# Patient Record
Sex: MALE | Race: WHITE | Employment: FULL TIME | URBAN - METROPOLITAN AREA
[De-identification: names, ages, dates, MRNs, and addresses within clinical notes are randomized per-mention and may not be internally consistent; named-entity substitution may affect disease eponyms.]

---

## 2019-10-18 ENCOUNTER — HOSPITAL ENCOUNTER (OUTPATIENT)
Dept: ORTHOPEDIC SURGERY | Age: 44
Discharge: HOME | End: 2019-10-20
Payer: COMMERCIAL

## 2019-10-18 DIAGNOSIS — M25.569 KNEE PAIN, UNSPECIFIED CHRONICITY, UNSPECIFIED LATERALITY: ICD-10-CM

## 2019-10-18 PROCEDURE — 73564 X-RAY EXAM KNEE 4 OR MORE: CPT

## 2019-11-13 ENCOUNTER — HOSPITAL ENCOUNTER (OUTPATIENT)
Dept: MRI IMAGING | Age: 44
Discharge: HOME | End: 2019-11-15
Payer: COMMERCIAL

## 2019-11-13 DIAGNOSIS — M23.92 ACUTE INTERNAL DERANGEMENT OF LEFT KNEE: ICD-10-CM

## 2019-11-13 PROCEDURE — 73721 MRI JNT OF LWR EXTRE W/O DYE: CPT

## 2023-04-17 DIAGNOSIS — F41.9 ANXIETY: Primary | ICD-10-CM

## 2023-04-17 RX ORDER — ESCITALOPRAM OXALATE 10 MG/1
10 TABLET ORAL DAILY
Qty: 90 TABLET | Refills: 0 | Status: SHIPPED | OUTPATIENT
Start: 2023-04-17 | End: 2023-05-25 | Stop reason: DRUGHIGH

## 2023-04-17 RX ORDER — ESCITALOPRAM OXALATE 10 MG/1
10 TABLET ORAL DAILY
COMMUNITY
End: 2023-04-17 | Stop reason: SDUPTHER

## 2023-05-22 PROBLEM — G47.33 OSA (OBSTRUCTIVE SLEEP APNEA): Status: ACTIVE | Noted: 2023-05-22

## 2023-05-22 PROBLEM — M54.2 NECK PAIN: Status: ACTIVE | Noted: 2023-05-22

## 2023-05-22 PROBLEM — R07.89 ATYPICAL CHEST PAIN: Status: ACTIVE | Noted: 2023-05-22

## 2023-05-22 PROBLEM — M75.82 ROTATOR CUFF TENDINITIS, LEFT: Status: ACTIVE | Noted: 2023-05-22

## 2023-05-22 PROBLEM — R42 DIZZINESS: Status: ACTIVE | Noted: 2023-05-22

## 2023-05-22 PROBLEM — R29.818 SUSPECTED SLEEP APNEA: Status: ACTIVE | Noted: 2023-05-22

## 2023-05-22 PROBLEM — M25.562 LEFT KNEE PAIN: Status: ACTIVE | Noted: 2023-05-22

## 2023-05-22 PROBLEM — R79.89 LOW TESTOSTERONE IN MALE: Status: ACTIVE | Noted: 2023-05-22

## 2023-05-22 PROBLEM — F41.9 ANXIETY: Status: ACTIVE | Noted: 2023-05-22

## 2023-05-22 PROBLEM — G47.00 INSOMNIA: Status: ACTIVE | Noted: 2023-05-22

## 2023-05-22 PROBLEM — M54.50 CHRONIC LOW BACK PAIN: Status: ACTIVE | Noted: 2023-05-22

## 2023-05-22 PROBLEM — E66.9 OBESITY (BMI 30-39.9): Status: ACTIVE | Noted: 2023-05-22

## 2023-05-22 PROBLEM — I25.10 CAD (CORONARY ARTERY DISEASE): Status: ACTIVE | Noted: 2023-05-22

## 2023-05-22 PROBLEM — M25.512 LEFT SHOULDER PAIN: Status: ACTIVE | Noted: 2023-05-22

## 2023-05-22 PROBLEM — R53.82 CHRONIC FATIGUE: Status: ACTIVE | Noted: 2023-05-22

## 2023-05-22 PROBLEM — K64.9 HEMORRHOIDS: Status: ACTIVE | Noted: 2023-05-22

## 2023-05-22 PROBLEM — G89.29 CHRONIC LOW BACK PAIN: Status: ACTIVE | Noted: 2023-05-22

## 2023-05-22 PROBLEM — E66.01 OBESITY, MORBID (MULTI): Status: ACTIVE | Noted: 2023-05-22

## 2023-05-22 PROBLEM — E78.5 HYPERLIPIDEMIA: Status: ACTIVE | Noted: 2023-05-22

## 2023-05-22 PROBLEM — Z52.6 LIVER DONOR: Status: ACTIVE | Noted: 2023-05-22

## 2023-05-22 PROBLEM — K92.1 HEMATOCHEZIA: Status: ACTIVE | Noted: 2023-05-22

## 2023-05-22 PROBLEM — K62.5 BRBPR (BRIGHT RED BLOOD PER RECTUM): Status: ACTIVE | Noted: 2023-05-22

## 2023-05-22 PROBLEM — K21.9 GERD (GASTROESOPHAGEAL REFLUX DISEASE): Status: ACTIVE | Noted: 2023-05-22

## 2023-05-22 PROBLEM — N52.9 ERECTILE DYSFUNCTION: Status: ACTIVE | Noted: 2023-05-22

## 2023-05-22 PROBLEM — E55.9 VITAMIN D DEFICIENCY: Status: ACTIVE | Noted: 2023-05-22

## 2023-05-22 PROBLEM — R79.89 ELEVATED LFTS: Status: ACTIVE | Noted: 2023-05-22

## 2023-05-22 PROBLEM — R06.83 SNORING: Status: ACTIVE | Noted: 2023-05-22

## 2023-05-22 PROBLEM — K76.0 FATTY LIVER: Status: ACTIVE | Noted: 2023-05-22

## 2023-05-22 PROBLEM — Z95.5 H/O HEART ARTERY STENT: Status: ACTIVE | Noted: 2023-05-22

## 2023-05-22 RX ORDER — CLOPIDOGREL BISULFATE 75 MG/1
75 TABLET ORAL DAILY
COMMUNITY
End: 2024-01-15 | Stop reason: ALTCHOICE

## 2023-05-22 RX ORDER — HYDROGEN PEROXIDE 3 %
20 SOLUTION, NON-ORAL MISCELLANEOUS
COMMUNITY
Start: 2020-12-07 | End: 2023-10-23 | Stop reason: SDUPTHER

## 2023-05-22 RX ORDER — ROSUVASTATIN CALCIUM 20 MG/1
20 TABLET, COATED ORAL DAILY
COMMUNITY
End: 2023-12-05 | Stop reason: SDUPTHER

## 2023-05-22 RX ORDER — ACETAMINOPHEN 500 MG
50 TABLET ORAL DAILY
COMMUNITY

## 2023-05-22 RX ORDER — NAPROXEN SODIUM 220 MG/1
1 TABLET, FILM COATED ORAL DAILY
COMMUNITY
Start: 2020-12-22 | End: 2024-03-05 | Stop reason: SDUPTHER

## 2023-05-22 RX ORDER — PRAMOXINE HYDROCHLORIDE HYDROCORTISONE ACETATE 100; 100 MG/10G; MG/10G
1 AEROSOL, FOAM TOPICAL 2 TIMES DAILY
COMMUNITY
Start: 2020-08-12 | End: 2023-05-25 | Stop reason: SDUPTHER

## 2023-05-22 RX ORDER — HYDROXYZINE PAMOATE 25 MG/1
25 CAPSULE ORAL EVERY 8 HOURS PRN
COMMUNITY
End: 2024-01-15 | Stop reason: ALTCHOICE

## 2023-05-22 RX ORDER — ATENOLOL 25 MG/1
25 TABLET ORAL DAILY
COMMUNITY
End: 2024-01-15 | Stop reason: SDUPTHER

## 2023-05-22 RX ORDER — AMOXICILLIN 500 MG
CAPSULE ORAL 2 TIMES DAILY
COMMUNITY
End: 2024-01-15 | Stop reason: DRUGHIGH

## 2023-05-22 RX ORDER — TADALAFIL 20 MG/1
20 TABLET ORAL DAILY PRN
COMMUNITY
Start: 2020-06-15 | End: 2023-05-25

## 2023-05-25 ENCOUNTER — OFFICE VISIT (OUTPATIENT)
Dept: PRIMARY CARE | Facility: CLINIC | Age: 48
End: 2023-05-25
Payer: COMMERCIAL

## 2023-05-25 VITALS
DIASTOLIC BLOOD PRESSURE: 78 MMHG | WEIGHT: 278.8 LBS | TEMPERATURE: 99 F | HEART RATE: 76 BPM | OXYGEN SATURATION: 95 % | HEIGHT: 70 IN | SYSTOLIC BLOOD PRESSURE: 120 MMHG | BODY MASS INDEX: 39.91 KG/M2

## 2023-05-25 DIAGNOSIS — E78.2 MIXED HYPERLIPIDEMIA: Primary | ICD-10-CM

## 2023-05-25 DIAGNOSIS — E66.01 CLASS 3 SEVERE OBESITY WITH SERIOUS COMORBIDITY AND BODY MASS INDEX (BMI) OF 40.0 TO 44.9 IN ADULT, UNSPECIFIED OBESITY TYPE (MULTI): ICD-10-CM

## 2023-05-25 DIAGNOSIS — I25.10 CORONARY ARTERY DISEASE INVOLVING NATIVE CORONARY ARTERY OF NATIVE HEART WITHOUT ANGINA PECTORIS: ICD-10-CM

## 2023-05-25 DIAGNOSIS — F41.9 ANXIETY: ICD-10-CM

## 2023-05-25 DIAGNOSIS — K64.9 HEMORRHOIDS, UNSPECIFIED HEMORRHOID TYPE: ICD-10-CM

## 2023-05-25 DIAGNOSIS — G47.33 OSA (OBSTRUCTIVE SLEEP APNEA): ICD-10-CM

## 2023-05-25 DIAGNOSIS — N52.9 ERECTILE DYSFUNCTION, UNSPECIFIED ERECTILE DYSFUNCTION TYPE: ICD-10-CM

## 2023-05-25 DIAGNOSIS — E55.9 VITAMIN D DEFICIENCY: ICD-10-CM

## 2023-05-25 PROBLEM — M25.512 LEFT SHOULDER PAIN: Status: RESOLVED | Noted: 2023-05-22 | Resolved: 2023-05-25

## 2023-05-25 PROBLEM — R06.83 SNORING: Status: RESOLVED | Noted: 2023-05-22 | Resolved: 2023-05-25

## 2023-05-25 PROBLEM — M54.2 NECK PAIN: Status: RESOLVED | Noted: 2023-05-22 | Resolved: 2023-05-25

## 2023-05-25 PROBLEM — E66.9 OBESITY (BMI 30-39.9): Status: RESOLVED | Noted: 2023-05-22 | Resolved: 2023-05-25

## 2023-05-25 PROBLEM — R29.818 SUSPECTED SLEEP APNEA: Status: RESOLVED | Noted: 2023-05-22 | Resolved: 2023-05-25

## 2023-05-25 PROBLEM — M25.562 LEFT KNEE PAIN: Status: RESOLVED | Noted: 2023-05-22 | Resolved: 2023-05-25

## 2023-05-25 PROCEDURE — 3008F BODY MASS INDEX DOCD: CPT | Performed by: FAMILY MEDICINE

## 2023-05-25 PROCEDURE — 1036F TOBACCO NON-USER: CPT | Performed by: FAMILY MEDICINE

## 2023-05-25 PROCEDURE — 99214 OFFICE O/P EST MOD 30 MIN: CPT | Performed by: FAMILY MEDICINE

## 2023-05-25 RX ORDER — TADALAFIL 20 MG/1
20 TABLET ORAL DAILY PRN
Qty: 10 TABLET | Refills: 0 | Status: CANCELLED | OUTPATIENT
Start: 2023-05-25

## 2023-05-25 RX ORDER — SILDENAFIL 100 MG/1
100 TABLET, FILM COATED ORAL DAILY PRN
Qty: 30 TABLET | Refills: 0 | Status: SHIPPED | OUTPATIENT
Start: 2023-05-25 | End: 2024-06-06

## 2023-05-25 RX ORDER — PRAMOXINE HYDROCHLORIDE HYDROCORTISONE ACETATE 100; 100 MG/10G; MG/10G
1 AEROSOL, FOAM TOPICAL 2 TIMES DAILY
Qty: 10 G | Refills: 2 | Status: SHIPPED | OUTPATIENT
Start: 2023-05-25

## 2023-05-25 RX ORDER — ESCITALOPRAM OXALATE 20 MG/1
20 TABLET ORAL DAILY
Qty: 90 TABLET | Refills: 0 | Status: SHIPPED | OUTPATIENT
Start: 2023-05-25 | End: 2023-05-25 | Stop reason: SDUPTHER

## 2023-05-25 RX ORDER — ERGOCALCIFEROL 1.25 MG/1
CAPSULE ORAL
COMMUNITY
Start: 2020-05-10 | End: 2024-01-15 | Stop reason: ALTCHOICE

## 2023-05-25 RX ORDER — ESCITALOPRAM OXALATE 20 MG/1
20 TABLET ORAL DAILY
Qty: 90 TABLET | Refills: 0 | Status: SHIPPED | OUTPATIENT
Start: 2023-05-25 | End: 2023-08-29

## 2023-05-25 ASSESSMENT — PATIENT HEALTH QUESTIONNAIRE - PHQ9
SUM OF ALL RESPONSES TO PHQ9 QUESTIONS 1 AND 2: 0
1. LITTLE INTEREST OR PLEASURE IN DOING THINGS: NOT AT ALL
2. FEELING DOWN, DEPRESSED OR HOPELESS: NOT AT ALL

## 2023-05-25 NOTE — PROGRESS NOTES
Subjective   Patient ID: Orlando Washington is a 48 y.o. male who presents for Follow-up.    HPI     5/25/2023: He states he is experiencing hemorrhoidal flare ups  when taking Cialis.  He is looking to switch to Viagra.    Patient has hyperlipidemia.  He does try to limit the amount of fatty foods and high cholesterol foods that are consumed.  Pt has been compliant with rosuvastatin and denies any noted side effects.     He has CAD.  Pt underwent PCI with stenting in the past.  He follows with cardiology on a regular basis.  He denies any chest pain, or exercise intolerance.     Pt has vitamin D deficiency.  He is compliant with his vitamin d supplement.     He has anxiety.  Patient reports that his anxiety has been stable on the current dose of the escitalopram.     Pt has GERD.   Patient states that PPIs working well to control symptoms and denies any breakthrough heartburn.      Pt has ALHAJI.  He has been trying to utilize it nightly.  He finds the CPAP mask on the ground when waking up and believes he is taking it off subconsciously.     Pt has elevated LFTs on labs.  Ultrasound on 5/27/22 revealed fatty liver disease.  He will continue to follow with Dr. Boyle (hepatology). Next visit in in July 2023.       Review of Systems    Constitutional: Patient denies any fever, chills, loss of appetite, or unexplained weight loss.  Cardiovascular: Patient denies any chest pain, shortness of breath with exertion, tachycardia, palpitations, orthopnea, or paroxysmal nocturnal dyspnea.  Respiratory: Patient denies any cough, shortness breath, or wheezing.  Gastrointestinal patient denies any nausea, vomiting, diarrhea, constipation, melena, hematochezia, or reflux symptoms  Skin: Denies any rashes or skin lesions   Neurology: Patient denies any new motor or sensory losses. Denies any numbness, tingling, and weakness. Patient also denies any tremor, seizures, or gait instability.  Endocrinology: Denies any polyuria, polydipsia,  "polyphagia, or heat/cold intolerance.     SEE ALSO HISTORY OF PRESENT ILLNESS     Objective   /78   Pulse 76   Temp 37.2 °C (99 °F)   Ht 1.778 m (5' 10\")   Wt 126 kg (278 lb 12.8 oz)   SpO2 95%   BMI 40.00 kg/m²     Physical Exam    Gen. appearance: Alert and cooperative, in no acute distress, well-developed, well-nourished obese male.     Neck: Supple and without adenopathy, no JVD at 90° and no carotid bruits are noted. There is no thyromegaly, thyroid tenderness, or palpable thyroid nodules.   Cardiovascular: Regular rate and rhythm without murmur or ectopy.  Respiratory: Lungs are clear to auscultation bilaterally with good air exchange. Good respiratory effort, no accessory muscle use.  Skin: Good turgor, moist, warm and without rashes or lesions.  Neurological exam: Alert and oriented x3, no tremor, normal gait.  Psychiatric: Appropriate mood and affect, good insight and judgment, no delusions or thought disorders, no suicidal or homicidal ideation.  Extremities: No clubbing, cyanosis, or edema.     Assessment/Plan     Elevated LFTs / Fatty liver: Ultrasound of the liver was done 5/27/2022 and labs done 6/11/2022.  Pt saw Dr. Boyle 12/2022. Ordered a Fibroscan and additional labs.  Pt has follow up in 7/2023.        Hyperlipidemia: Stable based on last labs.  Patient will continue with the current medication. Dietary changes, exercise, and maintenance of healthy weight were discussed at length.     Coronary artery disease: Stable based on symptoms.  Patient is without worrisome signs or symptoms for unstable angina.   Risk factor modification was discussed at length.   Dietary changes, exercise and maintenance of a healthy weight were discussed.     Vitamin D deficiency: Stable on last labs.   Patient will continue taking vitamin D 2000 units QD OTC.     Erectile dysfunction: 5/25/2023: Discontinued tadalafil and prescribed sildenafil 100 mg PRN due to reported side effects of hemorroidal " exacerbation when he uses the Cialis.    Sleep Apnea:   Pt is not utilizing CPAP effectively as he is taking it off subconsciously during his sleep.     Anxiety:  Stable based on symptoms.  We will continue the escitalopram at the current dose.    Hemorrhoids:  He can use Proctofoam as needed for symptoms.  We can refer to GI for further evaluation if symptoms do not improve.    Obesity: Dietary changes, exercise, and maintenance of a healthy weight were discussed at length.         COLONOSCOPY DUE 1/7/2031  PSA DUE 12/28/2023    By signing my name below, I, Margaux Goodson Scribe, attest that this documentation has been prepared under the direction and in the presence of Dr. Maxwell.  All medical record entries made by the Scribe were at my direction and personally dictated by me. I have reviewed the chart and agree that the record accurately reflects my personal performance of the history, physical exam, discussion and plan. (Dr. Maxwell).

## 2023-05-25 NOTE — PATIENT INSTRUCTIONS
Changed the Cialis to Viagra as requested.    Continue the current medications. Follow up in 3 months with labs PRIOR.    It was a pleasure to see you today. Thank you for choosing us for your health care needs.    If you have lab or other testing completed and have not been informed of results within one week, please call the office for your results.    If you haven't done so, consider signing up for [employer], the [facility] personal health record. Ask the staff how you can get started.

## 2023-06-16 PROBLEM — E66.813 CLASS 3 SEVERE OBESITY WITH SERIOUS COMORBIDITY AND BODY MASS INDEX (BMI) OF 40.0 TO 44.9 IN ADULT: Status: ACTIVE | Noted: 2023-05-22

## 2023-07-17 LAB
ALANINE AMINOTRANSFERASE (SGPT) (U/L) IN SER/PLAS: 46 U/L (ref 10–52)
ALBUMIN (G/DL) IN SER/PLAS: 4.2 G/DL (ref 3.4–5)
ALKALINE PHOSPHATASE (U/L) IN SER/PLAS: 66 U/L (ref 33–120)
ASPARTATE AMINOTRANSFERASE (SGOT) (U/L) IN SER/PLAS: 31 U/L (ref 9–39)
BILIRUBIN DIRECT (MG/DL) IN SER/PLAS: 0.1 MG/DL (ref 0–0.3)
BILIRUBIN TOTAL (MG/DL) IN SER/PLAS: 0.4 MG/DL (ref 0–1.2)
PROTEIN TOTAL: 7.1 G/DL (ref 6.4–8.2)

## 2023-08-29 ENCOUNTER — OFFICE VISIT (OUTPATIENT)
Dept: PRIMARY CARE | Facility: CLINIC | Age: 48
End: 2023-08-29
Payer: COMMERCIAL

## 2023-08-29 VITALS
SYSTOLIC BLOOD PRESSURE: 122 MMHG | TEMPERATURE: 97.6 F | OXYGEN SATURATION: 96 % | DIASTOLIC BLOOD PRESSURE: 80 MMHG | BODY MASS INDEX: 39.93 KG/M2 | HEIGHT: 70 IN | HEART RATE: 98 BPM | WEIGHT: 278.9 LBS

## 2023-08-29 DIAGNOSIS — G47.33 OSA (OBSTRUCTIVE SLEEP APNEA): ICD-10-CM

## 2023-08-29 DIAGNOSIS — N52.9 ERECTILE DYSFUNCTION, UNSPECIFIED ERECTILE DYSFUNCTION TYPE: ICD-10-CM

## 2023-08-29 DIAGNOSIS — I25.10 CORONARY ARTERY DISEASE INVOLVING NATIVE CORONARY ARTERY OF NATIVE HEART WITHOUT ANGINA PECTORIS: ICD-10-CM

## 2023-08-29 DIAGNOSIS — R41.3 MEMORY CHANGES: ICD-10-CM

## 2023-08-29 DIAGNOSIS — E78.2 MIXED HYPERLIPIDEMIA: Primary | ICD-10-CM

## 2023-08-29 DIAGNOSIS — E66.01 CLASS 3 SEVERE OBESITY WITH SERIOUS COMORBIDITY AND BODY MASS INDEX (BMI) OF 40.0 TO 44.9 IN ADULT, UNSPECIFIED OBESITY TYPE (MULTI): ICD-10-CM

## 2023-08-29 DIAGNOSIS — F41.9 ANXIETY: ICD-10-CM

## 2023-08-29 DIAGNOSIS — E55.9 VITAMIN D DEFICIENCY: ICD-10-CM

## 2023-08-29 PROCEDURE — 1036F TOBACCO NON-USER: CPT | Performed by: FAMILY MEDICINE

## 2023-08-29 PROCEDURE — 3008F BODY MASS INDEX DOCD: CPT | Performed by: FAMILY MEDICINE

## 2023-08-29 PROCEDURE — 99214 OFFICE O/P EST MOD 30 MIN: CPT | Performed by: FAMILY MEDICINE

## 2023-08-29 RX ORDER — VENLAFAXINE HYDROCHLORIDE 75 MG/1
75 CAPSULE, EXTENDED RELEASE ORAL DAILY
Qty: 90 CAPSULE | Refills: 0 | Status: SHIPPED | OUTPATIENT
Start: 2023-08-29 | End: 2023-09-27 | Stop reason: SINTOL

## 2023-08-29 RX ORDER — VENLAFAXINE HYDROCHLORIDE 37.5 MG/1
37.5 CAPSULE, EXTENDED RELEASE ORAL DAILY
Qty: 7 CAPSULE | Refills: 0 | Status: SHIPPED | OUTPATIENT
Start: 2023-08-29 | End: 2023-09-27 | Stop reason: SDUPTHER

## 2023-08-29 ASSESSMENT — PATIENT HEALTH QUESTIONNAIRE - PHQ9
10. IF YOU CHECKED OFF ANY PROBLEMS, HOW DIFFICULT HAVE THESE PROBLEMS MADE IT FOR YOU TO DO YOUR WORK, TAKE CARE OF THINGS AT HOME, OR GET ALONG WITH OTHER PEOPLE: NOT DIFFICULT AT ALL
SUM OF ALL RESPONSES TO PHQ9 QUESTIONS 1 AND 2: 2
2. FEELING DOWN, DEPRESSED OR HOPELESS: SEVERAL DAYS
1. LITTLE INTEREST OR PLEASURE IN DOING THINGS: SEVERAL DAYS

## 2023-08-29 NOTE — PROGRESS NOTES
"Subjective   Patient ID: Orlando Washington is a 48 y.o. male who presents for Follow-up.    HPI     8/29/2023  LABS ORDERED FOR TODAY WERE NOT COMPLETED.    Not sure Lexapro is helping.  States that he \"feels like it is not doing anything.\"  He notes worsening of emotional control.  He also notes flat mood and affect.    He states he is having memory difficulties due to having too many thoughts in his head.  He tried taking a supplement and states it helped him for the first week.  He notes a decline in his appetite and exercise due to focus issues.      Patient has hyperlipidemia.  He does try to limit the amount of fatty foods and high cholesterol foods that are consumed.  Pt has been compliant with rosuvastatin and denies any noted side effects.     He has CAD.  Pt underwent PCI with stenting in the past.  He follows with cardiology on a regular basis.  He denies any chest pain, or exercise intolerance.     Pt has vitamin D deficiency.  He is compliant with his vitamin d supplement.      He has anxiety.  Patient reports that his anxiety has been stable on the current dose of the escitalopram.     Pt has GERD.   Patient states that PPIs working well to control symptoms and denies any breakthrough heartburn.      Pt has ALHAJI.  He has been trying to utilize it nightly.  He finds the CPAP mask on the ground when waking up and believes he is taking it off subconsciously.  8/29/2023: He is still unable to use his CPAP due to taking it off, regardless of what mask he puts on.     Pt has elevated LFTs on labs.  Ultrasound on 5/27/22 revealed fatty liver disease.  He will continue to follow with Dr. Boyle (hepatology). Next visit in in July 2023.       Review of Systems    Constitutional: Patient denies any fever, chills, loss of appetite, or unexplained weight loss.  HEENT: Denies any headache, sore throat, eye pain, ear pain, decreased vision, or decreased hearing. Patient also denies any rhinorrhea.  Cardiovascular: Patient " "denies any chest pain, shortness of breath with exertion, tachycardia, palpitations, orthopnea, or paroxysmal nocturnal dyspnea.  Respiratory: Patient denies any cough, shortness of breath, or wheezing.  Gastrointestinal: Patient denies any nausea, vomiting, diarrhea, constipation, melena, hematochezia, or reflux symptoms.  Musculoskeletal: Patient denies any myalgia, arthralgia, joint swelling, or joint deformity.  Skin: Denies any rashes or skin lesions.  Neurology: Patient denies any new motor or sensory losses. Denies any numbness, tingling, weakness, and incoordination of the extremities. Patient also denies any tremor, seizures, or gait instability.  Endocrinology: Denies any polyuria, polydipsia, polyphagia, or heat/cold intolerance.  Psychiatric: Denies any anxiety, depression, or suicidal/homicidal ideation.  Hematology: Patient denies any abnormal bruising or bleeding.  Genitourinary: Patient denies any dysuria, hematuria, urinary urgency/frequency.    SEE HISTORY OF PRESENT ILLNESS ALSO    Objective   /86   Pulse 98   Temp 36.4 °C (97.6 °F)   Ht 1.778 m (5' 10\")   Wt 127 kg (278 lb 14.4 oz)   SpO2 96%   BMI 40.02 kg/m²     Physical Exam    Gen. appearance: Alert and cooperative, in no acute distress, well-developed, well-nourished obese male.     Neck: Supple and without adenopathy, no JVD at 90° and no carotid bruits are noted. There is no thyromegaly, thyroid tenderness, or palpable thyroid nodules.   Cardiovascular: Regular rate and rhythm without murmur or ectopy.  Respiratory: Lungs are clear to auscultation bilaterally with good air exchange. Good respiratory effort, no accessory muscle use.  Skin: Good turgor, moist, warm and without rashes or lesions.  Neurological exam: Alert and oriented x3, no tremor, normal gait.  Psychiatric: Appropriate mood and affect, good insight and judgment, no delusions or thought disorders, no suicidal or homicidal ideation.  Extremities: No clubbing, " cyanosis, or edema.     Assessment/Plan   LABS ORDERED FOR 8/29/2023 VISIT WERE NOT COMPLETED.      Hyperlipidemia: Stable based on last labs.  Patient will continue with the current medication. Dietary changes, exercise, and maintenance of healthy weight were discussed at length.     Coronary artery disease: Stable based on symptoms.  Patient is without worrisome signs or symptoms for unstable angina.   Risk factor modification was discussed at length.   Dietary changes, exercise and maintenance of a healthy weight were discussed.     Vitamin D deficiency: Stable on last labs.   Patient will continue taking vitamin D 2000 units QD OTC.     Erectile dysfunction:   Stable based on symptoms  Continue sildenafil as needed.     Sleep Apnea:   Pt is not utilizing CPAP effectively as he is taking it off subconsciously during his sleep.     Anxiety:  8/29/2023: Venlafaxine prescribed as a replacement for escitalopram which does not seem to be relieving symptoms.    Memory changes: (NEW) 8/29/2023: Referral to neurology made.      Obesity: Dietary changes, exercise, and maintenance of a healthy weight were discussed at length.          COLONOSCOPY DUE 1/7/2031  PSA DUE 12/28/2023    Orders Placed This Encounter   Procedures    Comprehensive Metabolic Panel    Lipid Panel    Referral to Neurology     Requested Prescriptions     Signed Prescriptions Disp Refills    venlafaxine XR (Effexor-XR) 75 mg 24 hr capsule 90 capsule 0     Sig: Take 1 capsule (75 mg) by mouth once daily. Do not crush or chew.    venlafaxine XR (Effexor-XR) 37.5 mg 24 hr capsule 7 capsule 0     Sig: Take 1 capsule (37.5 mg) by mouth once daily. Do not crush or chew.             By signing my name below, I, Lubna Miner, attest that this documentation has been prepared under the direction and in the presence of Dr. Maxwell.  All medical record entries made by the Lubna were at my direction and personally dictated by me. I have reviewed the  chart and agree that the record accurately reflects my personal performance of the history, physical exam, discussion and plan. (Dr. Maxwell).

## 2023-09-04 DIAGNOSIS — F41.9 ANXIETY: ICD-10-CM

## 2023-09-06 RX ORDER — VENLAFAXINE HYDROCHLORIDE 37.5 MG/1
37.5 CAPSULE, EXTENDED RELEASE ORAL DAILY
Qty: 7 CAPSULE | Refills: 0 | OUTPATIENT
Start: 2023-09-06

## 2023-09-26 ENCOUNTER — TELEPHONE (OUTPATIENT)
Dept: PRIMARY CARE | Facility: CLINIC | Age: 48
End: 2023-09-26
Payer: COMMERCIAL

## 2023-09-26 DIAGNOSIS — F41.9 ANXIETY: Primary | ICD-10-CM

## 2023-09-26 NOTE — TELEPHONE ENCOUNTER
Patient called asking to be off Effexor due to brain fog? Would like to see if he can get something else?

## 2023-09-27 RX ORDER — PAROXETINE 10 MG/1
10 TABLET, FILM COATED ORAL EVERY MORNING
Qty: 30 TABLET | Refills: 2 | Status: SHIPPED | OUTPATIENT
Start: 2023-09-27 | End: 2023-10-12 | Stop reason: SINTOL

## 2023-09-27 RX ORDER — VENLAFAXINE HYDROCHLORIDE 37.5 MG/1
37.5 CAPSULE, EXTENDED RELEASE ORAL DAILY
Qty: 7 CAPSULE | Refills: 0 | Status: SHIPPED | OUTPATIENT
Start: 2023-09-27 | End: 2023-10-12

## 2023-09-27 NOTE — TELEPHONE ENCOUNTER
Advise pt that we will need to reduce the dose to 37.5 mg once a day for 7 days, then stop the venlafaxine.    Then we can start the new medication called paroxetine 10 mg once a day.    Rxs were sent to his [facility] pharmacy in [place].

## 2023-10-04 DIAGNOSIS — F41.9 ANXIETY: ICD-10-CM

## 2023-10-04 RX ORDER — VENLAFAXINE HYDROCHLORIDE 37.5 MG/1
37.5 CAPSULE, EXTENDED RELEASE ORAL DAILY
Qty: 7 CAPSULE | Refills: 0 | OUTPATIENT
Start: 2023-10-04

## 2023-10-12 ENCOUNTER — TELEPHONE (OUTPATIENT)
Dept: PRIMARY CARE | Facility: CLINIC | Age: 48
End: 2023-10-12
Payer: COMMERCIAL

## 2023-10-12 DIAGNOSIS — F41.9 ANXIETY: Primary | ICD-10-CM

## 2023-10-12 RX ORDER — BUSPIRONE HYDROCHLORIDE 5 MG/1
TABLET ORAL
Qty: 120 TABLET | Refills: 0 | Status: SHIPPED | OUTPATIENT
Start: 2023-10-12 | End: 2023-10-23 | Stop reason: SDUPTHER

## 2023-10-12 NOTE — TELEPHONE ENCOUNTER
Continue off the paroxetine and we will start him on Buspar instead.  Follow the directions on the new RX.

## 2023-10-12 NOTE — TELEPHONE ENCOUNTER
"Patient states he has been having trouble with medication he was rx. He states he has been experiencing extreme fatigue, a feeling of dread, and loudness which was described as a \"power surge\" which got overwhelming for him. He states he was switched from venlafaxine to the paroxetine but is now afraid to take the paroxetine due to the side effects. He is scheduled with Nery on 10/23, but wants to know what he could do or if he can have an earlier virtual appt, please advise   "

## 2023-10-23 ENCOUNTER — TELEMEDICINE (OUTPATIENT)
Dept: PRIMARY CARE | Facility: CLINIC | Age: 48
End: 2023-10-23
Payer: COMMERCIAL

## 2023-10-23 DIAGNOSIS — F41.9 ANXIETY: ICD-10-CM

## 2023-10-23 DIAGNOSIS — K21.9 GASTROESOPHAGEAL REFLUX DISEASE WITHOUT ESOPHAGITIS: Primary | ICD-10-CM

## 2023-10-23 DIAGNOSIS — F32.A DEPRESSION, UNSPECIFIED DEPRESSION TYPE: ICD-10-CM

## 2023-10-23 PROCEDURE — 99213 OFFICE O/P EST LOW 20 MIN: CPT | Performed by: PHYSICIAN ASSISTANT

## 2023-10-23 RX ORDER — BUSPIRONE HYDROCHLORIDE 15 MG/1
15 TABLET ORAL 2 TIMES DAILY
Qty: 60 TABLET | Refills: 2 | Status: SHIPPED | OUTPATIENT
Start: 2023-10-23

## 2023-10-23 RX ORDER — HYDROGEN PEROXIDE 3 %
20 SOLUTION, NON-ORAL MISCELLANEOUS
Qty: 90 CAPSULE | Refills: 0 | Status: SHIPPED | OUTPATIENT
Start: 2023-10-23 | End: 2024-06-06 | Stop reason: SDUPTHER

## 2023-10-23 RX ORDER — SERTRALINE HYDROCHLORIDE 25 MG/1
TABLET, FILM COATED ORAL
Qty: 60 TABLET | Refills: 2 | Status: SHIPPED | OUTPATIENT
Start: 2023-10-23 | End: 2023-10-30

## 2023-10-23 NOTE — PROGRESS NOTES
Subjective   Patient ID: Orlando Washington is a 48 y.o. male who presents for Medication Problem.    HPI     Anxiety/depression-patient is complaining of unstable mood.  He was previously treated with Lexapro and that was not working so on 8/29/2023 Dr. Maxwell switched him over to Effexor.  Patient states that the Effexor brought out aggression, anger, and lack of patience in him.  He also restarted smoking because of his anxiety/depression.  He has no thoughts of hurting himself or others but does not like who his mood was on the Effexor so he stopped that about a month ago.  He is currently calling because he would like to restart on something else to help with his mood.  He has failed Paxil in the past but stated he is willing to try something like Paxil or Zoloft again to help with depression/anxiety.   He still is using BuSpar 10 mg twice daily but states that its not working as well as it used to.    GERD-patient was previously prescribed Nexium and would like to restart that because he is now having heartburn that wakes him up in the middle of the night.  He denies any blood in his stool/black tar-like stools.  He tries to modify his diet but states that that is not working well for him at this moment.        Review of Systems  Constitutional: Patient denies any fever, chills, loss of appetite, or unexplained weight loss.  Cardiovascular: Patient denies any chest pain, shortness of breath with exertion, tachycardia, palpitations, orthopnea, or paroxysmal nocturnal dyspnea.  Respiratory: Patient denies any cough, shortness breath, or wheezing.  Gastrointestinal patient denies any nausea, vomiting, diarrhea, constipation, melena, hematochezia, or reflux symptoms  Skin: Denies any rashes or skin lesions   Neurology: Patient denies any new motor or sensory losses.  Denies any numbness, tingling, weakness, and incoordination of the extremities.  Patient also denies any tremor, seizures, or gait  instability.  Endocrinology: Denies any polyuria, polydipsia, polyphagia, or heat/cold intolerance.    Objective   There were no vitals taken for this visit. VV no vitals     Physical Exam  Gen. appearance: Alert and cooperative, no acute distress, well developed, well-nourished male.  Resp- no acute resp distress  Skin- no rashes of lesions on visible skin surfaces  Neuro- A & O x 3, no tremor    Assessment/Plan   Diagnoses and all orders for this visit:  Gastroesophageal reflux disease without esophagitis  -     esomeprazole (NexIUM) 20 mg DR capsule; Take 1 capsule (20 mg) by mouth once daily in the morning. Take before meals.  Pt will restart Nexium to use once daily to help with symptom relief.  If symptoms or not improved within the next 2 weeks he is to call/return to the clinic and we will discuss other options including a possible EGD.    Anxiety & Depression, unspecified depression type-  -     busPIRone (Buspar) 15 mg tablet; Take 1 tablet (15 mg) by mouth 2 times a day.  -     sertraline (Zoloft) 25 mg tablet; Take 1 tab daily x 7 days then on day 8 increase up to 2 tablets daily as maintenance dose,  -     Referral to Psychiatry; Future  Patient has already failed Lexapro, Effexor (ended up bringing bipolar type characteristics).  He has been referred to psychiatry for further evaluation and treatment but in the meantime we will increase his BuSpar to 15 mg twice daily and add Zoloft 25 mg x 7 days and then increase to 2 tablets daily as a maintenance dose until he sees a psychiatrist.  He has no thoughts of hurting himself or others.    We will call him with psychiatry appointment once that has been scheduled.    Patient understands that should they have testing outside   facilities that we may not receive the results and was told to call us if they have not heard from our office within a week after testing.    [facility] uses voice recognition technology for dictations.  Sometimes the software misinterprets words. Please take this into account when reading this.

## 2023-10-29 DIAGNOSIS — F41.9 ANXIETY: ICD-10-CM

## 2023-10-30 RX ORDER — SERTRALINE HYDROCHLORIDE 25 MG/1
TABLET, FILM COATED ORAL
Qty: 30 TABLET | Refills: 1 | Status: SHIPPED | OUTPATIENT
Start: 2023-10-30 | End: 2023-12-05 | Stop reason: DRUGHIGH

## 2023-12-02 ENCOUNTER — LAB (OUTPATIENT)
Dept: LAB | Facility: LAB | Age: 48
End: 2023-12-02
Payer: COMMERCIAL

## 2023-12-02 DIAGNOSIS — I25.10 CORONARY ARTERY DISEASE INVOLVING NATIVE CORONARY ARTERY OF NATIVE HEART WITHOUT ANGINA PECTORIS: ICD-10-CM

## 2023-12-02 DIAGNOSIS — E78.2 MIXED HYPERLIPIDEMIA: ICD-10-CM

## 2023-12-02 LAB
ALBUMIN SERPL BCP-MCNC: 4.5 G/DL (ref 3.4–5)
ALP SERPL-CCNC: 67 U/L (ref 33–120)
ALT SERPL W P-5'-P-CCNC: 68 U/L (ref 10–52)
ANION GAP SERPL CALC-SCNC: 11 MMOL/L (ref 10–20)
AST SERPL W P-5'-P-CCNC: 59 U/L (ref 9–39)
BILIRUB SERPL-MCNC: 0.8 MG/DL (ref 0–1.2)
BUN SERPL-MCNC: 22 MG/DL (ref 6–23)
CALCIUM SERPL-MCNC: 9.5 MG/DL (ref 8.6–10.3)
CHLORIDE SERPL-SCNC: 105 MMOL/L (ref 98–107)
CHOLEST SERPL-MCNC: 122 MG/DL (ref 0–199)
CHOLESTEROL/HDL RATIO: 4.2
CO2 SERPL-SCNC: 27 MMOL/L (ref 21–32)
CREAT SERPL-MCNC: 1.12 MG/DL (ref 0.5–1.3)
GFR SERPL CREATININE-BSD FRML MDRD: 81 ML/MIN/1.73M*2
GLUCOSE SERPL-MCNC: 106 MG/DL (ref 74–99)
HDLC SERPL-MCNC: 28.8 MG/DL
LDLC SERPL CALC-MCNC: 38 MG/DL
NON HDL CHOLESTEROL: 93 MG/DL (ref 0–149)
POTASSIUM SERPL-SCNC: 3.9 MMOL/L (ref 3.5–5.3)
PROT SERPL-MCNC: 7.5 G/DL (ref 6.4–8.2)
SODIUM SERPL-SCNC: 139 MMOL/L (ref 136–145)
TRIGL SERPL-MCNC: 275 MG/DL (ref 0–149)
VLDL: 55 MG/DL (ref 0–40)

## 2023-12-02 PROCEDURE — 80061 LIPID PANEL: CPT

## 2023-12-02 PROCEDURE — 84443 ASSAY THYROID STIM HORMONE: CPT

## 2023-12-02 PROCEDURE — 80053 COMPREHEN METABOLIC PANEL: CPT

## 2023-12-02 PROCEDURE — 36415 COLL VENOUS BLD VENIPUNCTURE: CPT

## 2023-12-05 ENCOUNTER — OFFICE VISIT (OUTPATIENT)
Dept: PRIMARY CARE | Facility: CLINIC | Age: 48
End: 2023-12-05
Payer: COMMERCIAL

## 2023-12-05 VITALS
DIASTOLIC BLOOD PRESSURE: 78 MMHG | SYSTOLIC BLOOD PRESSURE: 120 MMHG | WEIGHT: 277.4 LBS | BODY MASS INDEX: 39.71 KG/M2 | TEMPERATURE: 98.1 F | HEIGHT: 70 IN | OXYGEN SATURATION: 94 % | HEART RATE: 88 BPM

## 2023-12-05 DIAGNOSIS — R41.3 MEMORY CHANGES: ICD-10-CM

## 2023-12-05 DIAGNOSIS — E66.01 CLASS 2 SEVERE OBESITY WITH SERIOUS COMORBIDITY AND BODY MASS INDEX (BMI) OF 39.0 TO 39.9 IN ADULT, UNSPECIFIED OBESITY TYPE (MULTI): ICD-10-CM

## 2023-12-05 DIAGNOSIS — F41.9 ANXIETY: ICD-10-CM

## 2023-12-05 DIAGNOSIS — I25.10 CORONARY ARTERY DISEASE INVOLVING NATIVE CORONARY ARTERY OF NATIVE HEART WITHOUT ANGINA PECTORIS: ICD-10-CM

## 2023-12-05 DIAGNOSIS — N52.9 ERECTILE DYSFUNCTION, UNSPECIFIED ERECTILE DYSFUNCTION TYPE: ICD-10-CM

## 2023-12-05 DIAGNOSIS — E55.9 VITAMIN D DEFICIENCY: ICD-10-CM

## 2023-12-05 DIAGNOSIS — G47.33 OSA (OBSTRUCTIVE SLEEP APNEA): ICD-10-CM

## 2023-12-05 DIAGNOSIS — E78.2 MIXED HYPERLIPIDEMIA: Primary | ICD-10-CM

## 2023-12-05 PROBLEM — E66.812 CLASS 2 SEVERE OBESITY WITH SERIOUS COMORBIDITY AND BODY MASS INDEX (BMI) OF 39.0 TO 39.9 IN ADULT: Status: ACTIVE | Noted: 2023-05-22

## 2023-12-05 LAB — TSH SERPL-ACNC: 1.93 MIU/L (ref 0.44–3.98)

## 2023-12-05 PROCEDURE — 1036F TOBACCO NON-USER: CPT | Performed by: FAMILY MEDICINE

## 2023-12-05 PROCEDURE — 3008F BODY MASS INDEX DOCD: CPT | Performed by: FAMILY MEDICINE

## 2023-12-05 PROCEDURE — 99214 OFFICE O/P EST MOD 30 MIN: CPT | Performed by: FAMILY MEDICINE

## 2023-12-05 RX ORDER — SERTRALINE HYDROCHLORIDE 25 MG/1
100 TABLET, FILM COATED ORAL DAILY
Status: SHIPPED
Start: 2023-12-05 | End: 2023-12-31 | Stop reason: DRUGHIGH

## 2023-12-05 RX ORDER — ROSUVASTATIN CALCIUM 20 MG/1
20 TABLET, COATED ORAL DAILY
Qty: 90 TABLET | Refills: 0 | Status: SHIPPED | OUTPATIENT
Start: 2023-12-05 | End: 2024-03-05 | Stop reason: SDUPTHER

## 2023-12-05 NOTE — PATIENT INSTRUCTIONS
Flu vax declined.    Follow up in 3 months.    It was a pleasure to see you today. Thank you for choosing us for your health care needs.    If you have lab or other testing completed and have not been informed of results within one week, please call the office for your results.    If you haven't done so, consider signing up for [facility] [employer], the [facility] personal health record. Ask the staff how you can get started.

## 2023-12-05 NOTE — PROGRESS NOTES
Subjective   Patient ID: Orlando Washington is a 48 y.o. male who presents for Follow-up.    HPI     No new concerns  Labs: 12/02/2023  Colonoscopy: 2021    Pt has anxiety:  Pt saw my PA 10/23/2023 and started Zoloft.  He has failed paroxetine and venlafaxine due to side effects.  Pt was referred to psychiatry at that time as well.  He did see psychiatry and zoloft was increased to 100 mg and remains on the Buspirone.  Anxiety symptoms are better at the higher dose.    Had complaints of memory changes at his 8/29/2023 appt.  Was referred to neurology but has not yet scheduled.        Patient has hyperlipidemia.  He does try to limit the amount of fatty foods and high cholesterol foods that are consumed.  Pt has been compliant with rosuvastatin and denies any noted side effects.     He has CAD.  Pt underwent PCI with stenting in the past.  He follows with cardiology on a regular basis.  He denies any chest pain, or exercise intolerance.     Pt has vitamin D deficiency.  He is compliant with his vitamin d supplement.      He has anxiety.  Patient reports that his anxiety has been stable on the current dose of the Zoloft.     Pt has GERD.   Patient states that PPIs working well to control symptoms and denies any breakthrough heartburn.      Pt has ALHAJI.  He has been trying to utilize it nightly.  He is still unable to use his CPAP all night due to taking it off (during the night) regardless of what mask he puts on.     Has known fatty liver Dz with elevated LFTs.  Was evaluated by hepatology.         Review of Systems  Constitutional: Patient denies any fever, chills, loss of appetite, or unexplained weight loss.  Cardiovascular: Patient denies any chest pain, shortness of breath with exertion, tachycardia, palpitations, orthopnea, or paroxysmal nocturnal dyspnea.  Respiratory: Patient denies any cough, shortness breath, or wheezing.  Gastrointestinal: Patient denies any nausea, vomiting, diarrhea, constipation, melena,  "hematochezia, or reflux symptoms.  Skin: Denies any rashes or skin lesions.   Neurology: Patient denies any new motor or sensory losses.  Denies any numbness, tingling, weakness, and incoordination of the extremities.  Patient also denies any tremor, seizures, or gait instability.  Endocrinology: Denies any polyuria, polydipsia, polyphagia, or heat/cold intolerance.      Objective   /78   Pulse 88   Temp 36.7 °C (98.1 °F)   Ht 1.778 m (5' 10\")   Wt 126 kg (277 lb 6.4 oz)   SpO2 94%   BMI 39.80 kg/m²     Physical Exam  General Appearance: Alert and cooperative, in no acute distress, well-developed/well-nourished overweight male.    Neck: Supple and without adenopathy or rigidity.  There is no JVD at 90° and no carotid bruits are noted.  There is no thyromegaly, thyroid tenderness, or palpable thyroid nodules.  Heart: Regular rate and rhythm without murmur or ectopy.  Respiratory: Lungs are clear to auscultation bilaterally with good air exchange.  Good respiratory effort and no accessory muscle use.  Skin: Good turgor, moist, warm and without rashes or lesions.  Neurological exam: Alert and oriented ×3, no tremor, normal gait.  Extremities: No clubbing, cyanosis, or edema    Assessment/Plan     Hyperlipidemia: Stable based on last labs.  Patient will continue with the current medication. Dietary changes, exercise, and maintenance of healthy weight were discussed at length.     Coronary artery disease: Stable based on symptoms.  Patient is without worrisome signs or symptoms for unstable angina.   Risk factor modification was discussed at length.   Dietary changes, exercise and maintenance of a healthy weight were discussed.     Vitamin D deficiency: Stable on last labs.   Patient will continue taking vitamin D 2000 units QD OTC.     Erectile dysfunction:   Stable based on symptoms  Continue sildenafil as needed.     Sleep Apnea:   Pt is not utilizing CPAP effectively as he frequently takes it off " subconsciously during his sleep.     Anxiety:  Stable based on symptoms.  Continue medication at current dose.  CAN CHANGE TO  MG TABLET FOR EASE OF DOSING IN FUTURE.    Memory changes: NEW AT HIS 8/29/2023 APPT.  HE WAS REFERRED TO NEUROLOGY AT PREVIOUS APPT.   He has not yet been evaluated.      Obesity: Dietary changes, exercise, and maintenance of a healthy weight were discussed at length.            COLONOSCOPY DUE 1/7/2031  PSA DUE 12/28/2023      Orders Placed This Encounter   Procedures    TSH with reflex to Free T4 if abnormal     Requested Prescriptions     Signed Prescriptions Disp Refills    rosuvastatin (Crestor) 20 mg tablet 90 tablet 0     Sig: Take 1 tablet (20 mg) by mouth once daily.    sertraline (Zoloft) 25 mg tablet       Sig: Take 4 tablets (100 mg) by mouth once daily.

## 2023-12-31 RX ORDER — SERTRALINE HYDROCHLORIDE 25 MG/1
100 TABLET, FILM COATED ORAL DAILY
Status: SHIPPED
Start: 2023-12-31

## 2024-01-15 ENCOUNTER — OFFICE VISIT (OUTPATIENT)
Dept: CARDIOLOGY | Facility: CLINIC | Age: 49
End: 2024-01-15
Payer: COMMERCIAL

## 2024-01-15 VITALS
WEIGHT: 273.6 LBS | DIASTOLIC BLOOD PRESSURE: 80 MMHG | HEIGHT: 70 IN | HEART RATE: 72 BPM | BODY MASS INDEX: 39.17 KG/M2 | OXYGEN SATURATION: 97 % | SYSTOLIC BLOOD PRESSURE: 118 MMHG

## 2024-01-15 DIAGNOSIS — Z95.5 H/O HEART ARTERY STENT: ICD-10-CM

## 2024-01-15 DIAGNOSIS — I25.10 CORONARY ARTERY DISEASE INVOLVING NATIVE CORONARY ARTERY OF NATIVE HEART, UNSPECIFIED WHETHER ANGINA PRESENT: Primary | ICD-10-CM

## 2024-01-15 DIAGNOSIS — I25.10 CORONARY ARTERY DISEASE INVOLVING NATIVE CORONARY ARTERY OF NATIVE HEART, UNSPECIFIED WHETHER ANGINA PRESENT: ICD-10-CM

## 2024-01-15 DIAGNOSIS — E78.1 HYPERTRIGLYCERIDEMIA: ICD-10-CM

## 2024-01-15 DIAGNOSIS — G47.33 OBSTRUCTIVE SLEEP APNEA: ICD-10-CM

## 2024-01-15 DIAGNOSIS — E78.49 OTHER HYPERLIPIDEMIA: ICD-10-CM

## 2024-01-15 DIAGNOSIS — R00.2 PALPITATIONS: ICD-10-CM

## 2024-01-15 PROCEDURE — 1036F TOBACCO NON-USER: CPT | Performed by: INTERNAL MEDICINE

## 2024-01-15 PROCEDURE — 3008F BODY MASS INDEX DOCD: CPT | Performed by: INTERNAL MEDICINE

## 2024-01-15 PROCEDURE — 99215 OFFICE O/P EST HI 40 MIN: CPT | Performed by: INTERNAL MEDICINE

## 2024-01-15 RX ORDER — ICOSAPENT ETHYL 1 G/1
1 CAPSULE ORAL
Qty: 180 CAPSULE | Refills: 3 | Status: SHIPPED | OUTPATIENT
Start: 2024-01-15 | End: 2025-01-14

## 2024-01-15 RX ORDER — ATENOLOL 25 MG/1
25 TABLET ORAL DAILY
Qty: 90 TABLET | Refills: 3 | Status: SHIPPED | OUTPATIENT
Start: 2024-01-15 | End: 2024-01-17

## 2024-01-15 ASSESSMENT — PAIN SCALES - GENERAL: PAINLEVEL: 0-NO PAIN

## 2024-01-15 NOTE — PROGRESS NOTES
"Chief Complaint:   see below     History Of Present Illness:    Orlando Washington is a 48 y.o. male presenting with CAD, palpitations.    This 48-year-old hypertensive, hyperlipidemic, hypertriglyceridemic man with obesity and coronary artery disease returns to the office in routine follow-up. The patient is status post prior drug-eluting stent of the LAD in December 2019 along with PTCA of the diagonal branch. He has a 50% stenosis in the mid RCA and has normal LV function.     He has had are episodes of palpitations that last for about 30 seconds.  These episodes feel like his heart is racing, associated with which he feels winded.  He has had episodic heartburn after taking Viagra or Cialis.  The symptoms of heartburn last for 24 hours, and are not exertional.  He walks around the block, and does resistance exercises every other day for an hour.      He has ALHAJI, and is not using the prescribed apparatus.       Last Recorded Vitals:  Vitals:    01/15/24 1328   BP: 118/80   BP Location: Right arm   Patient Position: Sitting   BP Cuff Size: Adult   Pulse: 72   SpO2: 97%   Weight: 124 kg (273 lb 9.6 oz)   Height: 1.778 m (5' 10\")   Body mass index is 39.26 kg/m².      Past Medical History:  He has a past medical history of Personal history of other diseases of the circulatory system (01/14/2020) and Personal history of other specified conditions (01/14/2020).    Past Surgical History:  He has a past surgical history that includes Other surgical history (01/14/2021); Other surgical history (12/10/2020); Other surgical history (12/10/2020); Other surgical history (12/10/2020); and Other surgical history (12/10/2020).      Social History:  He reports that he has never smoked. He has never used smokeless tobacco. He reports current alcohol use. He reports that he does not use drugs.    Family History:  No family history on file.     Allergies:  Effexor [venlafaxine]    Outpatient Medications:  Current Outpatient Medications "   Medication Instructions    aspirin 81 mg chewable tablet 1 tablet, oral, Daily    atenolol (TENORMIN) 25 mg, oral, Daily    busPIRone (BUSPAR) 15 mg, oral, 2 times daily    cholecalciferol (VITAMIN D-3) 50 mcg, oral, Daily    esomeprazole (NEXIUM) 20 mg, oral, Daily before breakfast    hydrocortisone-pramoxine (Proctofoam HC) rectal foam 1 applicator, rectal, 2 times daily    icosapent ethyL (VASCEPA) 1 g, oral, 2 times daily with meals    psyllium seed, with sugar, (FIBER SUPPLEMENT ORAL) once a day    rosuvastatin (CRESTOR) 20 mg, oral, Daily    sertraline (ZOLOFT) 100 mg, oral, Daily    sildenafil (VIAGRA) 100 mg, oral, Daily PRN       Physical Exam:  GENERAL:  pleasant 48 year-old  HEENT: No xanthelasma  NECK: Supple, no palpable adenopathy or thyromegaly  CHEST: Clear to auscultation, respiratory effort unlabored  CARDIAC: RRR, normal S1 and S2, no audible murmur, rub, gallop, carotids are brisk, PMI is not displaced  ABD: Active bowel sounds, nontender, no organomegaly, no evidence of ascites  EXT: No clubbing, cyanosis, edema, or tenderness  NEURO: Awake, alert, appropriate, speech is fluent       Last Labs:  CBC -  Lab Results   Component Value Date    WBC 6.4 12/14/2021    HGB 16.2 12/14/2021    HCT 48.4 12/14/2021    MCV 92 12/14/2021     12/14/2021       CMP -  Lab Results   Component Value Date    CALCIUM 9.5 12/02/2023    PROT 7.5 12/02/2023    ALBUMIN 4.5 12/02/2023    AST 59 (H) 12/02/2023    ALT 68 (H) 12/02/2023    ALKPHOS 67 12/02/2023    BILITOT 0.8 12/02/2023       LIPID PANEL -   Lab Results   Component Value Date    CHOL 122 12/02/2023    TRIG 275 (H) 12/02/2023    HDL 28.8 12/02/2023    CHHDL 4.2 12/02/2023    LDLF 39 02/18/2023    VLDL 55 (H) 12/02/2023    NHDL 93 12/02/2023       RENAL FUNCTION PANEL -   Lab Results   Component Value Date    GLUCOSE 106 (H) 12/02/2023     12/02/2023    K 3.9 12/02/2023     12/02/2023    CO2 27 12/02/2023    ANIONGAP 11 12/02/2023    BUN  22 12/02/2023    CREATININE 1.12 12/02/2023    GFRMALE >90 02/18/2023    CALCIUM 9.5 12/02/2023    ALBUMIN 4.5 12/02/2023        Lab Results   Component Value Date    BNP 21 12/27/2019    HGBA1C 6.0 (A) 12/27/2022         Lab review: I have Chemistry CMP:   Lab Results   Component Value Date    ALBUMIN 4.5 12/02/2023    CALCIUM 9.5 12/02/2023    CO2 27 12/02/2023    CREATININE 1.12 12/02/2023    GLUCOSE 106 (H) 12/02/2023    BILITOT 0.8 12/02/2023    PROT 7.5 12/02/2023    ALT 68 (H) 12/02/2023    AST 59 (H) 12/02/2023    ALKPHOS 67 12/02/2023   , Chemistry BMP   Lab Results   Component Value Date    GLUCOSE 106 (H) 12/02/2023    CALCIUM 9.5 12/02/2023    CO2 27 12/02/2023    CREATININE 1.12 12/02/2023   , CBC:  Lab Results   Component Value Date    WBC 6.4 12/14/2021    RBC 5.27 12/14/2021    HGB 16.2 12/14/2021    HCT 48.4 12/14/2021    MCV 92 12/14/2021    MCHC 33.5 12/14/2021    RDW 12.1 12/14/2021    NRBC 0.0 12/27/2019   , and Lipids:   Lab Results   Component Value Date    CHOL 122 12/02/2023    HDL 28.8 12/02/2023    LDLCALC 38 12/02/2023    TRIG 275 (H) 12/02/2023       Assessment/Plan   Problem List Items Addressed This Visit          Cardiac and Vasculature    CAD (coronary artery disease) - Primary    Relevant Medications    atenolol (Tenormin) 25 mg tablet    H/O heart artery stent    Hyperlipidemia    Palpitations    Relevant Orders    Holter Or Event Cardiac Monitor    Follow Up In Cardiology    Hypertriglyceridemia    Relevant Medications    icosapent ethyL (Vascepa) 1 gram capsule       Endocrine/Metabolic    BMI 39.0-39.9,adult       Sleep    Obstructive sleep apnea    Relevant Orders    Referral to Adult Sleep Medicine     1.  CAD: The patient has stable, functional class I CAD, and is doing well.  No additional testing is necessary at present. Important aspects of lifestyle modification were discussed in detail with the patient.  Recent labs and testing have been reviewed.  -In regards to lipid  management, see below    2.  Hypertriglyceridemia: Start Icosapent ethyl 1000 mg twice daily, risk factor modification discussed.    3.  Heartburn following sildenafil: This is a known side effect of this medication, and his description of symptoms as being persistent for 24 hours, and not exertional does not sound anginal.    4.  Untreated sleep apnea: Discussed with the patient the potential cardiac sequelae of this.  -Referral to sleep medicine    5.  Palpitations: In light of his history, the possibility of an arrhythmia such as atrial fibrillation is raised.  There has been no documentation of this.  -Monitor study  -Follow-up after testing is completed.    Armin Nava MD

## 2024-01-15 NOTE — PATIENT INSTRUCTIONS
You need to stop smoking. As you know, smoking increases the risk of future heart attacks, strokes, cancer, and emphysema. In addition to these problems, someone who smokes a pack a day spends $2000 per year on tobacco alone. Those costs add up, so that someone who has smoked a pack a day for twenty years has spent $40,000 out of pocket on tobacco in their lifetime. To help you quit smoking, you should call the [place] [facility]uit Line at 5-872-QUIT-NOW. They will have other tips to help you quit. Also, there are good medicines that can help you quit.  [facility] has a tobacco clinic that can guide you through the process.  Just let me know if you'd like a referral.

## 2024-01-16 ENCOUNTER — APPOINTMENT (OUTPATIENT)
Dept: NEUROLOGY | Facility: CLINIC | Age: 49
End: 2024-01-16
Payer: COMMERCIAL

## 2024-01-17 RX ORDER — ATENOLOL 25 MG/1
25 TABLET ORAL DAILY
Qty: 90 TABLET | Refills: 3 | Status: SHIPPED | OUTPATIENT
Start: 2024-01-17

## 2024-01-18 ENCOUNTER — HOSPITAL ENCOUNTER (OUTPATIENT)
Dept: CARDIOLOGY | Facility: HOSPITAL | Age: 49
Discharge: HOME | End: 2024-01-18
Payer: COMMERCIAL

## 2024-01-18 DIAGNOSIS — R00.2 PALPITATIONS: ICD-10-CM

## 2024-01-18 PROCEDURE — 93272 ECG/REVIEW INTERPRET ONLY: CPT | Performed by: INTERNAL MEDICINE

## 2024-01-18 PROCEDURE — 93270 REMOTE 30 DAY ECG REV/REPORT: CPT

## 2024-02-28 ENCOUNTER — APPOINTMENT (OUTPATIENT)
Dept: RADIOLOGY | Facility: HOSPITAL | Age: 49
End: 2024-02-28
Payer: COMMERCIAL

## 2024-02-28 ENCOUNTER — HOSPITAL ENCOUNTER (EMERGENCY)
Facility: HOSPITAL | Age: 49
Discharge: HOME | End: 2024-02-28
Payer: COMMERCIAL

## 2024-02-28 VITALS
HEART RATE: 87 BPM | WEIGHT: 256 LBS | RESPIRATION RATE: 18 BRPM | OXYGEN SATURATION: 98 % | TEMPERATURE: 97.3 F | DIASTOLIC BLOOD PRESSURE: 72 MMHG | HEIGHT: 70 IN | BODY MASS INDEX: 36.65 KG/M2 | SYSTOLIC BLOOD PRESSURE: 115 MMHG

## 2024-02-28 DIAGNOSIS — M79.643 TENDERNESS OF ANATOMICAL SNUFFBOX: ICD-10-CM

## 2024-02-28 DIAGNOSIS — S69.92XA INJURY OF LEFT WRIST, INITIAL ENCOUNTER: Primary | ICD-10-CM

## 2024-02-28 PROCEDURE — 29075 APPL CST ELBW FNGR SHORT ARM: CPT

## 2024-02-28 PROCEDURE — 73110 X-RAY EXAM OF WRIST: CPT | Mod: LT

## 2024-02-28 PROCEDURE — 2500000001 HC RX 250 WO HCPCS SELF ADMINISTERED DRUGS (ALT 637 FOR MEDICARE OP)

## 2024-02-28 PROCEDURE — 73130 X-RAY EXAM OF HAND: CPT | Mod: LT

## 2024-02-28 PROCEDURE — 99284 EMERGENCY DEPT VISIT MOD MDM: CPT | Mod: 25

## 2024-02-28 PROCEDURE — 73130 X-RAY EXAM OF HAND: CPT | Mod: LEFT SIDE | Performed by: SURGERY

## 2024-02-28 PROCEDURE — 73110 X-RAY EXAM OF WRIST: CPT | Mod: LEFT SIDE | Performed by: SURGERY

## 2024-02-28 RX ORDER — HYDROCODONE BITARTRATE AND ACETAMINOPHEN 5; 325 MG/1; MG/1
1 TABLET ORAL ONCE
Status: COMPLETED | OUTPATIENT
Start: 2024-02-28 | End: 2024-02-28

## 2024-02-28 RX ADMIN — HYDROCODONE BITARTRATE AND ACETAMINOPHEN 1 TABLET: 5; 325 TABLET ORAL at 22:30

## 2024-02-28 ASSESSMENT — PAIN DESCRIPTION - DESCRIPTORS: DESCRIPTORS: THROBBING

## 2024-02-28 ASSESSMENT — PAIN SCALES - GENERAL
PAINLEVEL_OUTOF10: 7
PAINLEVEL_OUTOF10: 3

## 2024-02-28 ASSESSMENT — COLUMBIA-SUICIDE SEVERITY RATING SCALE - C-SSRS
6. HAVE YOU EVER DONE ANYTHING, STARTED TO DO ANYTHING, OR PREPARED TO DO ANYTHING TO END YOUR LIFE?: NO
2. HAVE YOU ACTUALLY HAD ANY THOUGHTS OF KILLING YOURSELF?: NO
1. IN THE PAST MONTH, HAVE YOU WISHED YOU WERE DEAD OR WISHED YOU COULD GO TO SLEEP AND NOT WAKE UP?: NO

## 2024-02-28 ASSESSMENT — PAIN - FUNCTIONAL ASSESSMENT: PAIN_FUNCTIONAL_ASSESSMENT: 0-10

## 2024-02-28 ASSESSMENT — PAIN DESCRIPTION - ORIENTATION: ORIENTATION: LEFT

## 2024-02-28 ASSESSMENT — PAIN DESCRIPTION - LOCATION: LOCATION: WRIST

## 2024-02-28 ASSESSMENT — PAIN DESCRIPTION - PAIN TYPE: TYPE: ACUTE PAIN

## 2024-02-29 DIAGNOSIS — E78.2 MIXED HYPERLIPIDEMIA: ICD-10-CM

## 2024-02-29 NOTE — ED PROVIDER NOTES
HPI   Chief Complaint   Patient presents with    Fall     Pt fell in garage at home after tripping over boxes. Landed on ground, denies hitting head, no LOC. Takes ASA daily. C/O left wrist pain       History provided by: Patient and wife    Limitations to history: None    CC: Left wrist injury    HPI: 48-year-old male presents emergency department to be evaluated for a left wrist injury that occurred about an 30 minutes prior to arrival.  Patient was working in his garage when he accidentally tripped over some boxes falling and landing awkwardly on his wrist.  He denies head injury.  Denies losing consciousness.  Denies headache, vision changes, neck pain, nausea vomiting, lightheadedness or dizziness.  He takes a baby aspirin but denies use of anticoagulants or history intracranial hemorrhage.  Denies weakness, fatigue, syncope, or history of frequent falls.  Denies chest pain or difficulty breathing.  States that he was feeling well before hand.  Is currently complaining of pain in the palm of his hand as well as the distal wrist but reports some swelling.  Denies taking anything for his pain prior to arrival.  Denies pain or injury elsewhere.  Denies numbness or tingling in the extremity but states that it is hard for him to move his wrist and hand due to the pain.  Denies all other systemic symptoms.    ROS: Negative unless mentioned in HPI    Medical Hx:  medical history sent in for angina. Allergy to venlafaxine.  Immunizations up-to-date.    Physical exam:    Constitutional: Patient is well-nourished and well-developed.  Sitting comfortably in the room and in no distress.  Oriented to person, place, time, and situation.    HEENT: Head is normocephalic, atraumatic. Patient's airway is patent.  Tympanic membranes are clear bilaterally.  Nasal mucosa clear.  Mouth with normal mucosa.  Throat is not erythematous and there are no oropharyngeal exudates, uvula is midline.  No obvious facial deformities.    Eyes:  Clear bilaterally.  Pupils are equal round and reactive to light and accommodation.  Extraocular movements intact.      Cardiac: Regular rate, regular rhythm.  Heart sounds S1, S2.  No murmurs, rubs, or gallops.  PMI nondisplaced.  No JVD.    Respiratory: Regular respiratory rate and effort.  Breath sounds are clear and equal bilaterally, no adventitious lung sounds.  Patient is speaking in full sentences and is in no apparent respiratory distress. No use of accessory muscles.      Gastrointestinal: Abdomen is soft, nondistended, and nontender.  There are no obvious deformities.  No rebound tenderness or guarding.  Bowel sounds are normal active.    Genitourinary: No CVA or flank tenderness.    Musculoskeletal: Patient has tenderness over the distal radius and ulna as well as tenderness in the anatomical snuffbox.  There is some soft tissue swelling however he still has good range of motion in his wrist however it is limited given his pain and swelling.  He also has some nonspecific tenderness in the proximal and mid metacarpals but no tenderness or injury to the fingers.  No obvious bony deformities.  No tenderness or obvious injury elsewhere. No back or neck tenderness.  Capillary refill less than 3 seconds.  Strong peripheral pulses.  No sensory deficits.    Neurological: Patient is alert and oriented.  No focal deficits.  5/5 strength in all extremities.  Cranial nerves II through XII intact. GCS15.     Skin: Skin is normal color for race and is warm, dry, and intact.  No evidence of trauma.  No lesions, rashes, bruising, jaundice, or masses.    Psych: Appropriate mood and affect.  No apparent risk to self or others.    Heme/lymph: No adenopathy, lymphadenopathy, or splenomegaly    Physical exam is otherwise negative unless stated above or in history of present illness.    Patient updated on plan for lab testing, IV insertion, radiology imaging, and medications to be administered while in the ER (if indicated).  Patient updated on expected wait times for testing and results. Patient provided my name and told to ask any staff member for questions or concerns if they should arise. Electronic medical record reviewed.     MDM    Upon initial assessment, patient was healthy non-toxic appearing and in no apparent distress.     Patient presented to the emergency department with the chief complaint left wrist and hand injury. Patient has tenderness over the distal radius and ulna as well as tenderness in the anatomical snuffbox.  There is some soft tissue swelling however he still has good range of motion in his wrist however it is limited given his pain and swelling.  He also has some nonspecific tenderness in the proximal and mid metacarpals but no tenderness or injury to the fingers.  No obvious bony deformities.  No tenderness or obvious injury elsewhere. No back or neck tenderness.  Capillary refill less than 3 seconds.  Strong peripheral pulses.  No sensory deficits. On arrival to the emergency department, vital signs were within normal limits    Will give the patient a dose of p.o. Norco for his discomfort since his wife is able to drive him home and will get an x-ray of the wrist and hand.  X-ray confirmed the swelling but no obvious fracture.  I did place the patient in a thumb spica splint given his tenderness in the anatomical snuffbox in order to promote proper healing and prevent avascular necrosis.  This was done by me and the patient was neurovascularly intact before and after splint placement.  Patient will be [facility]  I discussed supportive gym including rest, ice, compression, and elevation as well as Tylenol as needed for his discomfort.  All questions and concerns addressed.  Reasons to return to ER discussed.  Patient verbalized understanding and agreement with the treatment plan and they remained hemodynamically stable in the ER.        This note was dictated  using a speech recognition program.  While an attempt was made at proof-reading to minimize errors, minor errors in transcription may be present                          [place] Coma Scale Score: 15                     Patient History   Past Medical History:   Diagnosis Date    Personal history of other diseases of the circulatory system 01/14/2020    History of angina pectoris    Personal history of other specified conditions 01/14/2020    History of palpitations     Past Surgical History:   Procedure Laterality Date    OTHER SURGICAL HISTORY  01/14/2021    Esophagogastroduodenoscopy    OTHER SURGICAL HISTORY  12/10/2020    Sinus surgery    OTHER SURGICAL HISTORY  12/10/2020    Vasectomy    OTHER SURGICAL HISTORY  12/10/2020    Colonoscopy    OTHER SURGICAL HISTORY  12/10/2020    Coronary artery stent placement     No family history on file.  Social History     Tobacco Use    Smoking status: Never    Smokeless tobacco: Never   Substance Use Topics    Alcohol use: Yes    Drug use: Never       Physical Exam   ED Triage Vitals [02/28/24 2218]   Temperature Heart Rate Respirations BP   36.3 °C (97.3 °F) 87 18 115/72      Pulse Ox Temp Source Heart Rate Source Patient Position   98 % Tympanic Monitor Sitting      BP Location FiO2 (%)     Right arm --       Physical Exam    ED Course & MDM   Diagnoses as of 02/28/24 2320   Injury of left wrist, initial encounter   Tenderness of anatomical snuffbox       Medical Decision Making      Procedure  Splint Application    Performed by: Donny Dominique PA-C  Authorized by: Donny Dominique PA-C    Consent:     Consent obtained:  Verbal    Consent given by:  Patient  Universal protocol:     Procedure explained and questions answered to patient or proxy's satisfaction: yes      Patient identity confirmed:  Verbally with patient  Pre-procedure details:     Distal neurologic exam:  Normal    Distal perfusion: distal pulses strong and brisk capillary refill    Procedure details:      Location:  Wrist    Wrist location:  L wrist    Cast type:  Short arm    Splint type:  Thumb spica    Supplies:  Elastic bandage, fiberglass and cotton padding    Attestation: Splint applied and adjusted personally by me    Post-procedure details:     Distal neurologic exam:  Normal    Distal perfusion: distal pulses strong and brisk capillary refill      Procedure completion:  Tolerated       Donny Dominique PA-C  02/28/24 [id number]       Donny Dominique PA-C  02/28/24 [id number]

## 2024-03-04 RX ORDER — ROSUVASTATIN CALCIUM 20 MG/1
20 TABLET, COATED ORAL DAILY
Qty: 90 TABLET | Refills: 0 | OUTPATIENT
Start: 2024-03-04

## 2024-03-05 ENCOUNTER — OFFICE VISIT (OUTPATIENT)
Dept: PRIMARY CARE | Facility: CLINIC | Age: 49
End: 2024-03-05
Payer: COMMERCIAL

## 2024-03-05 VITALS
SYSTOLIC BLOOD PRESSURE: 121 MMHG | TEMPERATURE: 98.5 F | OXYGEN SATURATION: 98 % | BODY MASS INDEX: 38.57 KG/M2 | DIASTOLIC BLOOD PRESSURE: 75 MMHG | WEIGHT: 269.4 LBS | HEIGHT: 70 IN | HEART RATE: 62 BPM

## 2024-03-05 DIAGNOSIS — E66.01 CLASS 2 SEVERE OBESITY WITH SERIOUS COMORBIDITY AND BODY MASS INDEX (BMI) OF 38.0 TO 38.9 IN ADULT, UNSPECIFIED OBESITY TYPE (MULTI): ICD-10-CM

## 2024-03-05 DIAGNOSIS — E78.2 MIXED HYPERLIPIDEMIA: Primary | ICD-10-CM

## 2024-03-05 DIAGNOSIS — Z12.5 PROSTATE CANCER SCREENING: ICD-10-CM

## 2024-03-05 DIAGNOSIS — F41.9 ANXIETY: ICD-10-CM

## 2024-03-05 DIAGNOSIS — N52.9 ERECTILE DYSFUNCTION, UNSPECIFIED ERECTILE DYSFUNCTION TYPE: ICD-10-CM

## 2024-03-05 DIAGNOSIS — E55.9 VITAMIN D DEFICIENCY: ICD-10-CM

## 2024-03-05 DIAGNOSIS — G47.33 OSA (OBSTRUCTIVE SLEEP APNEA): ICD-10-CM

## 2024-03-05 DIAGNOSIS — S63.502A SPRAIN OF LEFT FOREARM, INITIAL ENCOUNTER: ICD-10-CM

## 2024-03-05 DIAGNOSIS — I25.10 CORONARY ARTERY DISEASE INVOLVING NATIVE CORONARY ARTERY OF NATIVE HEART WITHOUT ANGINA PECTORIS: ICD-10-CM

## 2024-03-05 PROCEDURE — 3008F BODY MASS INDEX DOCD: CPT | Performed by: FAMILY MEDICINE

## 2024-03-05 PROCEDURE — 99214 OFFICE O/P EST MOD 30 MIN: CPT | Performed by: FAMILY MEDICINE

## 2024-03-05 RX ORDER — NAPROXEN SODIUM 220 MG/1
81 TABLET, FILM COATED ORAL DAILY
Qty: 90 TABLET | Refills: 0 | Status: SHIPPED | OUTPATIENT
Start: 2024-03-05 | End: 2024-06-06 | Stop reason: SDUPTHER

## 2024-03-05 RX ORDER — ROSUVASTATIN CALCIUM 20 MG/1
20 TABLET, COATED ORAL DAILY
Qty: 90 TABLET | Refills: 0 | Status: SHIPPED | OUTPATIENT
Start: 2024-03-05 | End: 2024-06-06 | Stop reason: SDUPTHER

## 2024-03-05 ASSESSMENT — PATIENT HEALTH QUESTIONNAIRE - PHQ9
SUM OF ALL RESPONSES TO PHQ9 QUESTIONS 1 AND 2: 0
2. FEELING DOWN, DEPRESSED OR HOPELESS: NOT AT ALL
1. LITTLE INTEREST OR PLEASURE IN DOING THINGS: NOT AT ALL

## 2024-03-05 NOTE — PROGRESS NOTES
Subjective   Patient ID: Orlando Washington is a 48 y.o. male who presents for Follow-up.    HPI     Patient states that he fell last Wednesday over some boxes that were laying around in the garage and hurt his arm.  He did go to [facility] for evaluation and was told there was no obvious fracture.   He was splinted and told to follow up with orthopedics and PCP.  X-ray report was reviewed and no fx was noted.  Splint removed from [facility] for examination.           Pt has anxiety:  10/23/2023 started on Zoloft.  He did see psychiatry and zoloft was increased to 100 mg and remains on the Buspirone.  Anxiety symptoms are better on the current dosing.         Patient has hyperlipidemia.  He does try to limit the amount of fatty foods and high cholesterol foods that are consumed.  Pt has been compliant with rosuvastatin and denies any noted side effects.     He has CAD.  Pt underwent PCI with stenting in the past.  He follows with cardiology on a regular basis.  He denies any chest pain, or exercise intolerance.     Pt has vitamin D deficiency.  He is compliant with his vitamin d supplement.      He has anxiety.  Patient reports that his anxiety has been stable on the current dose of the Zoloft.     Pt has GERD.   Patient states that PPIs working well to control symptoms and denies any breakthrough heartburn.      Pt has ALHAJI.  He has been trying to utilize it nightly.  He is still unable to use his CPAP all night due to taking it off (during the night) regardless of what mask he puts on.     Has known fatty liver Dz with elevated LFTs.  Was evaluated by hepatology.    Review of Systems  Constitutional: Patient denies any fever, chills, loss of appetite, or unexplained weight loss.  Cardiovascular: Patient denies any chest pain, shortness of breath with exertion, tachycardia, palpitations, orthopnea, or paroxysmal nocturnal dyspnea.  Respiratory: Patient denies any cough, shortness breath, or wheezing.  Gastrointestinal: Patient denies  "any nausea, vomiting, diarrhea, constipation, melena, hematochezia, or reflux symptoms.  Skin: Denies any rashes or skin lesions.   Neurology: Patient denies any new motor or sensory losses.  Denies any numbness, tingling, weakness, and incoordination of the extremities.  Patient also denies any tremor, seizures, or gait instability.  Endocrinology: Denies any polyuria, polydipsia, polyphagia, or heat/cold intolerance.      Objective   /75   Pulse 62   Temp 36.9 °C (98.5 °F)   Ht 1.778 m (5' 10\")   Wt 122 kg (269 lb 6.4 oz)   SpO2 98%   BMI 38.65 kg/m²     Physical Exam  General Appearance: Alert and cooperative, in no acute distress, well-developed/well-nourished, overweight male.    Neck: Supple and without adenopathy or rigidity.  There is no JVD at 90° and no carotid bruits are noted.  There is no thyromegaly, thyroid tenderness, or palpable thyroid nodules.  Heart: Regular rate and rhythm without murmur or ectopy.  Respiratory: Lungs are clear to auscultation bilaterally with good air exchange.  Good respiratory effort and no accessory muscle use.  Skin: Good turgor, moist, warm and without rashes or lesions.  Neurological exam: Alert and oriented ×3, no tremor, normal gait.  Extremities: No clubbing, cyanosis, or edema    MS: Examination of the left upper extremity after removal of the splint  Minimal tenderness to the forearm.  There is no tenderness over the left wrist and specifically no tenderness at the anatomic snuffbox. He has no noted swelling of the wrist of forearm.  Hand is neurovascularly intact.      Assessment/Plan   Hyperlipidemia: Stable based on last labs.  Patient will continue with the current medication. Dietary changes, exercise, and maintenance of healthy weight were discussed at length.     Coronary artery disease: Stable based on symptoms.  Patient is without worrisome signs or symptoms for unstable angina.   Risk factor modification was discussed at length.   Dietary " changes, exercise and maintenance of a healthy weight were discussed.     Vitamin D deficiency: Stable on last labs.   Patient will continue taking vitamin D 2000 units QD OTC.     Erectile dysfunction:   Stable based on symptoms  Continue sildenafil as needed.     Sleep Apnea:   Pt is not utilizing CPAP effectively as he frequently takes it off subconsciously during his sleep.     Anxiety:  Stable based on symptoms.  Continue medication at current dose.  CAN CHANGE TO  MG TABLET FOR EASE OF DOSING IN FUTURE.      Obesity: Dietary changes, exercise, and maintenance of a healthy weight were discussed at length.      Left forearm sprain:   X-ray neg for FX.  Exam was unremarkable.  He can remain out of the splint and return if any pain returns.       COLONOSCOPY DUE 1/7/2031  PSA DUE 12/28/2023        Orders Placed This Encounter   Procedures    Prostate Specific Antigen, Screen    Lipid Panel    Comprehensive Metabolic Panel    Vitamin D 25-Hydroxy,Total (for eval of Vitamin D levels)     Requested Prescriptions     Signed Prescriptions Disp Refills    aspirin 81 mg chewable tablet 90 tablet 0     Sig: Chew 1 tablet (81 mg) once daily.    rosuvastatin (Crestor) 20 mg tablet 90 tablet 0     Sig: Take 1 tablet (20 mg) by mouth once daily.

## 2024-03-05 NOTE — PATIENT INSTRUCTIONS
Your x-rays were all normal.  Splint was removed.  You can increase your activity and back off if you get pain.      Follow up in 3 months with labs to be done PRIOR.    It was a pleasure to see you today. Thank you for choosing us for your health care needs.    If you have lab or other testing completed and have not been informed of results within one week, please call the office for your results.    If you haven't done so, consider signing up for [facility] [employer], the [facility] personal health record. Ask the staff how you can get started.

## 2024-03-06 ENCOUNTER — OFFICE VISIT (OUTPATIENT)
Dept: CARDIOLOGY | Facility: CLINIC | Age: 49
End: 2024-03-06
Payer: COMMERCIAL

## 2024-03-06 VITALS
WEIGHT: 262 LBS | SYSTOLIC BLOOD PRESSURE: 118 MMHG | HEIGHT: 70 IN | DIASTOLIC BLOOD PRESSURE: 72 MMHG | HEART RATE: 64 BPM | BODY MASS INDEX: 37.51 KG/M2

## 2024-03-06 DIAGNOSIS — R00.2 PALPITATIONS: ICD-10-CM

## 2024-03-06 DIAGNOSIS — E78.49 OTHER HYPERLIPIDEMIA: ICD-10-CM

## 2024-03-06 DIAGNOSIS — E78.1 HYPERTRIGLYCERIDEMIA: ICD-10-CM

## 2024-03-06 DIAGNOSIS — I25.10 CORONARY ARTERY DISEASE INVOLVING NATIVE CORONARY ARTERY OF NATIVE HEART WITHOUT ANGINA PECTORIS: Primary | ICD-10-CM

## 2024-03-06 DIAGNOSIS — Z95.5 H/O HEART ARTERY STENT: ICD-10-CM

## 2024-03-06 PROCEDURE — 1036F TOBACCO NON-USER: CPT | Performed by: INTERNAL MEDICINE

## 2024-03-06 PROCEDURE — 99213 OFFICE O/P EST LOW 20 MIN: CPT | Performed by: INTERNAL MEDICINE

## 2024-03-06 PROCEDURE — 3008F BODY MASS INDEX DOCD: CPT | Performed by: INTERNAL MEDICINE

## 2024-03-06 NOTE — PATIENT INSTRUCTIONS

## 2024-03-06 NOTE — PROGRESS NOTES
"Chief Complaint:   Please see below.     History Of Present Illness:    Orlando Washington is a 48 y.o. male presenting with CAD.    This 48-year-old hypertensive, hyperlipidemic, hypertriglyceridemic man with obesity and coronary artery disease returns to the office in routine follow-up. The patient is status post prior drug-eluting stent of the LAD in December 2019 along with PTCA of the diagonal branch. He has a 50% stenosis in the mid RCA and has normal LV function.      He has lost 12 lbs since 1/15.  He feels much better.  The patient denies chest discomfort, dyspnea, palpitations, orthopnea, PND, syncope, and near syncope.       Last Recorded Vitals:  Vitals:    03/06/24 1500   BP: 118/72   BP Location: Left arm   Patient Position: Sitting   Pulse: 64   Weight: 119 kg (262 lb)   Height: 1.778 m (5' 10\")     Body mass index is 37.59 kg/m².    Past Medical History:  He has a past medical history of Personal history of other diseases of the circulatory system (01/14/2020) and Personal history of other specified conditions (01/14/2020).    Past Surgical History:  He has a past surgical history that includes Other surgical history (01/14/2021); Other surgical history (12/10/2020); Other surgical history (12/10/2020); Other surgical history (12/10/2020); and Other surgical history (12/10/2020).      Social History:  He reports that he has quit smoking. His smoking use included cigarettes. He has never used smokeless tobacco. He reports current alcohol use. He reports that he does not currently use drugs after having used the following drugs: Marijuana.    Family History:  Family History   Problem Relation Name Age of Onset    Supraventricular tachycardia Mother      Heart disease Father      Heart attack Brother      Other (Hole in Heart.) Mother's Brother      Heart murmur Son Andrei         Allergies:  Venlafaxine    Outpatient Medications:  Current Outpatient Medications   Medication Instructions    aspirin 81 mg, oral, " Daily    atenolol (TENORMIN) 25 mg, oral, Daily    busPIRone (BUSPAR) 15 mg, oral, 2 times daily    cholecalciferol (VITAMIN D-3) 50 mcg, oral, Daily    esomeprazole (NEXIUM) 20 mg, oral, Daily before breakfast    hydrocortisone-pramoxine (Proctofoam HC) rectal foam 1 applicator, rectal, 2 times daily    icosapent ethyL (VASCEPA) 1 g, oral, 2 times daily with meals    psyllium seed, with sugar, (FIBER SUPPLEMENT ORAL) once a day    rosuvastatin (CRESTOR) 20 mg, oral, Daily    sertraline (ZOLOFT) 100 mg, oral, Daily    sildenafil (VIAGRA) 100 mg, oral, Daily PRN       Physical Exam:  CHEST: Clear to auscultation  CARDIAC: Regular rhythm and rate, normal S1 and S2, no murmur rub or gallop; carotids are brisk without bruits, PMI is not displaced  ABDOMEN: Active bowel sounds, no tenderness, no evidence of ascites  EXTREMITIES: No clubbing, cyanosis, edema, or tenderness       Last Labs:  CBC -  Lab Results   Component Value Date    WBC 6.4 12/14/2021    HGB 16.2 12/14/2021    HCT 48.4 12/14/2021    MCV 92 12/14/2021     12/14/2021       CMP -  Lab Results   Component Value Date    CALCIUM 9.5 12/02/2023    PROT 7.5 12/02/2023    ALBUMIN 4.5 12/02/2023    AST 59 (H) 12/02/2023    ALT 68 (H) 12/02/2023    ALKPHOS 67 12/02/2023    BILITOT 0.8 12/02/2023       LIPID PANEL -   Lab Results   Component Value Date    CHOL 122 12/02/2023    TRIG 275 (H) 12/02/2023    HDL 28.8 12/02/2023    CHHDL 4.2 12/02/2023    LDLF 39 02/18/2023    VLDL 55 (H) 12/02/2023    NHDL 93 12/02/2023       RENAL FUNCTION PANEL -   Lab Results   Component Value Date    GLUCOSE 106 (H) 12/02/2023     12/02/2023    K 3.9 12/02/2023     12/02/2023    CO2 27 12/02/2023    ANIONGAP 11 12/02/2023    BUN 22 12/02/2023    CREATININE 1.12 12/02/2023    GFRMALE >90 02/18/2023    CALCIUM 9.5 12/02/2023    ALBUMIN 4.5 12/02/2023        Lab Results   Component Value Date    BNP 21 12/27/2019    HGBA1C 6.0 (A) 12/27/2022         Diagnostic  review: I have independently interpreted the Holter Monitor .  My findings are as summarized in the report.    Assessment/Plan   Problem List Items Addressed This Visit          Cardiac and Vasculature    CAD (coronary artery disease) - Primary    H/O heart artery stent    Hyperlipidemia    Palpitations    Hypertriglyceridemia       Endocrine/Metabolic    BMI 37.0-37.9, adult         Armin Nava MD

## 2024-03-11 NOTE — PATIENT INSTRUCTIONS
Referral to Dr. Bach (neurology) made today for your memory and focus issues.    Stop taking your Lexapro and start taking Venlafaxine prescribed today. Start the Venlafaxine at the lower dose and increase your dosage as prescribed.    Continue the current medications. Follow up in 3 months with labs PRIOR.    It was a pleasure to see you today. Thank you for choosing us for your health care needs.    If you have lab or other testing completed and have not been informed of results within one week, please call the office for your results.    If you haven't done so, consider signing up for [employer], the [facility] personal health record. Ask the staff how you can get started.   
Quality 226: Preventive Care And Screening: Tobacco Use: Screening And Cessation Intervention: Patient screened for tobacco use and is an ex/non-smoker
Detail Level: Generalized

## 2024-04-25 ENCOUNTER — APPOINTMENT (OUTPATIENT)
Dept: CARDIOLOGY | Facility: CLINIC | Age: 49
End: 2024-04-25
Payer: COMMERCIAL

## 2024-05-31 DIAGNOSIS — I25.10 CORONARY ARTERY DISEASE INVOLVING NATIVE CORONARY ARTERY OF NATIVE HEART WITHOUT ANGINA PECTORIS: ICD-10-CM

## 2024-05-31 DIAGNOSIS — E78.2 MIXED HYPERLIPIDEMIA: ICD-10-CM

## 2024-06-03 RX ORDER — ROSUVASTATIN CALCIUM 20 MG/1
20 TABLET, COATED ORAL DAILY
Qty: 90 TABLET | Refills: 0 | OUTPATIENT
Start: 2024-06-03

## 2024-06-03 RX ORDER — NAPROXEN SODIUM 220 MG/1
81 TABLET, FILM COATED ORAL DAILY
Qty: 90 TABLET | Refills: 0 | OUTPATIENT
Start: 2024-06-03

## 2024-06-06 ENCOUNTER — OFFICE VISIT (OUTPATIENT)
Dept: PRIMARY CARE | Facility: CLINIC | Age: 49
End: 2024-06-06
Payer: COMMERCIAL

## 2024-06-06 VITALS
OXYGEN SATURATION: 97 % | HEART RATE: 77 BPM | HEIGHT: 70 IN | SYSTOLIC BLOOD PRESSURE: 118 MMHG | WEIGHT: 289.9 LBS | TEMPERATURE: 99.1 F | BODY MASS INDEX: 41.5 KG/M2 | DIASTOLIC BLOOD PRESSURE: 80 MMHG

## 2024-06-06 DIAGNOSIS — E78.2 MIXED HYPERLIPIDEMIA: Primary | ICD-10-CM

## 2024-06-06 DIAGNOSIS — E55.9 VITAMIN D DEFICIENCY: ICD-10-CM

## 2024-06-06 DIAGNOSIS — E66.01 CLASS 3 SEVERE OBESITY WITH SERIOUS COMORBIDITY AND BODY MASS INDEX (BMI) OF 40.0 TO 44.9 IN ADULT, UNSPECIFIED OBESITY TYPE (MULTI): ICD-10-CM

## 2024-06-06 DIAGNOSIS — I25.10 CORONARY ARTERY DISEASE INVOLVING NATIVE CORONARY ARTERY OF NATIVE HEART WITHOUT ANGINA PECTORIS: ICD-10-CM

## 2024-06-06 DIAGNOSIS — F32.5 MAJOR DEPRESSIVE DISORDER WITH SINGLE EPISODE, IN FULL REMISSION (CMS-HCC): ICD-10-CM

## 2024-06-06 DIAGNOSIS — N52.9 ERECTILE DYSFUNCTION, UNSPECIFIED ERECTILE DYSFUNCTION TYPE: ICD-10-CM

## 2024-06-06 DIAGNOSIS — F41.9 ANXIETY: ICD-10-CM

## 2024-06-06 DIAGNOSIS — K21.9 GASTROESOPHAGEAL REFLUX DISEASE WITHOUT ESOPHAGITIS: ICD-10-CM

## 2024-06-06 PROBLEM — R42 DIZZINESS: Status: RESOLVED | Noted: 2023-05-22 | Resolved: 2024-06-06

## 2024-06-06 PROBLEM — K62.5 BRBPR (BRIGHT RED BLOOD PER RECTUM): Status: RESOLVED | Noted: 2023-05-22 | Resolved: 2024-06-06

## 2024-06-06 PROCEDURE — 99214 OFFICE O/P EST MOD 30 MIN: CPT | Performed by: FAMILY MEDICINE

## 2024-06-06 PROCEDURE — 3008F BODY MASS INDEX DOCD: CPT | Performed by: FAMILY MEDICINE

## 2024-06-06 RX ORDER — HYDROGEN PEROXIDE 3 %
20 SOLUTION, NON-ORAL MISCELLANEOUS
Qty: 90 CAPSULE | Refills: 0 | Status: SHIPPED | OUTPATIENT
Start: 2024-06-06 | End: 2024-09-04

## 2024-06-06 RX ORDER — ROSUVASTATIN CALCIUM 20 MG/1
20 TABLET, COATED ORAL DAILY
Qty: 90 TABLET | Refills: 0 | Status: SHIPPED | OUTPATIENT
Start: 2024-06-06

## 2024-06-06 RX ORDER — NAPROXEN SODIUM 220 MG/1
81 TABLET, FILM COATED ORAL DAILY
Qty: 90 TABLET | Refills: 0 | Status: SHIPPED | OUTPATIENT
Start: 2024-06-06

## 2024-06-06 NOTE — PROGRESS NOTES
Subjective   Patient ID: Orlando Washington is a 49 y.o. male who presents for Follow-up.    HPI     PT DID NOT HAVE ORDERED LABS COMPLETED FOR TODAY.  No new concerns   No recent BW   Colonoscopy: 2021         Patient has hyperlipidemia.  He does try to limit the amount of fatty foods and high cholesterol foods that are consumed.  Pt has been compliant with rosuvastatin and denies any noted side effects.     He has CAD.  Pt underwent PCI with stenting in the past.  He follows with cardiology on a regular basis.  He denies any chest pain, or exercise intolerance.     Pt has vitamin D deficiency.  He is compliant with his vitamin d supplement.      He has anxiety.  Patient reports that his anxiety has been stable on the current medications.     Pt has GERD.   Patient states that PPIs working well to control symptoms and denies any breakthrough heartburn.      Pt has ALHAJI.  He has been trying to utilize it nightly.  He is still unable to use his CPAP all night due to taking it off (during the night) regardless of what mask he puts on.     Has known fatty liver Dz with elevated LFTs.  Was evaluated by hepatology.      Review of Systems  Constitutional: Patient denies any fever, chills, loss of appetite, or unexplained weight loss.  Cardiovascular: Patient denies any chest pain, shortness of breath with exertion, tachycardia, palpitations, orthopnea, or paroxysmal nocturnal dyspnea.  Respiratory: Patient denies any cough, shortness breath, or wheezing.  Gastrointestinal: Patient denies any nausea, vomiting, diarrhea, constipation, melena, hematochezia, or reflux symptoms  Skin: Denies any rashes or skin lesions  Neurology: Patient denies any new motor or sensory losses. Denies any numbness, tingling, weakness, and incoordination of the extremities. Patient also denies any tremor, seizures, or gait instability.  Endocrinology: Denies any polyuria, polydipsia, polyphagia, or heat/cold intolerance.  Psychiatric: He denies any  "depression, or suicidal/homicidal ideation. Anxiety symptoms have been stable with the current medication.      Objective   /80   Pulse 77   Temp 37.3 °C (99.1 °F)   Ht 1.778 m (5' 10\")   Wt 131 kg (289 lb 14.4 oz)   SpO2 97%   BMI 41.60 kg/m²     Physical Exam  General Appearance: Alert and cooperative, in no acute distress, well-developed/well-nourished, obese male.     Neck: Supple and without adenopathy or rigidity. There is no JVD at 90° and no carotid bruits are noted. There is no thyromegaly, thyroid tenderness, or palpable thyroid nodules.  Heart: Regular rate and rhythm without murmur or ectopy.  Lungs: Clear to auscultation bilaterally with good air exchange.  Skin: Good turgor, moist, warm and without rashes or lesions.  Neurological exam: Alert and oriented ×3, no tremor, normal gait.  Extremities: No clubbing, cyanosis, or edema  Psychiatric: Appropriate mood and affect, good insight and judgment, no delusions or thought disorders, no suicidal or homicidal ideation      Assessment/Plan     Hyperlipidemia: Patient will continue with the current medication.  Dietary changes, exercise, and maintenance of healthy weight were discussed at length.    Coronary artery disease: Patient is without worrisome signs or symptoms for unstable angina.  Risk factor modification was discussed at length.  Dietary changes, exercise and maintenance of a healthy weight were discussed.    Vitamin D deficiency:  Stable based on most recent labs.  We will continue the current dose of vitamin D supplementation.    Erectile dysfunction:  Stable based on symptoms.  Continue the sildenafil PRN.    Anxiety/depression:  Stable based on symptoms.  We will continue the current medications.    Obesity: Dietary changes, exercise, and maintenance of a healthy weight were discussed at length.  Goal is to achieve a BMI less than 25.      Follow up in 3 months.       Scribe Attestation  By signing my name below, I, Joce Kimberly " , Scribe   attest that this documentation has been prepared under the direction and in the presence of Kulwant Maxwell DO.    Requested Prescriptions     Signed Prescriptions Disp Refills    rosuvastatin (Crestor) 20 mg tablet 90 tablet 0     Sig: Take 1 tablet (20 mg) by mouth once daily.    aspirin 81 mg chewable tablet 90 tablet 0     Sig: Chew 1 tablet (81 mg) once daily.    esomeprazole (NexIUM) 20 mg DR capsule 90 capsule 0     Sig: Take 1 capsule (20 mg) by mouth once daily in the morning. Take before meals.

## 2024-06-06 NOTE — PATIENT INSTRUCTIONS
Follow up in 3 months.    It was a pleasure to see you today. Thank you for choosing us for your health care needs.    If you have lab or other testing completed and have not been informed of results within one week, please call the office for your results.    If you haven't done so, consider signing up for [facility], the [facility] personal health record. Ask the staff how you can get started.

## 2024-07-02 ENCOUNTER — OFFICE VISIT (OUTPATIENT)
Dept: ORTHOPEDIC SURGERY | Facility: CLINIC | Age: 49
End: 2024-07-02
Payer: COMMERCIAL

## 2024-07-02 ENCOUNTER — HOSPITAL ENCOUNTER (OUTPATIENT)
Dept: RADIOLOGY | Facility: HOSPITAL | Age: 49
Discharge: HOME | End: 2024-07-02
Payer: COMMERCIAL

## 2024-07-02 DIAGNOSIS — M25.521 ELBOW PAIN, RIGHT: ICD-10-CM

## 2024-07-02 DIAGNOSIS — S46.211A BICEPS RUPTURE, DISTAL, RIGHT, INITIAL ENCOUNTER: ICD-10-CM

## 2024-07-02 PROCEDURE — 73080 X-RAY EXAM OF ELBOW: CPT | Mod: RT

## 2024-07-02 PROCEDURE — 99203 OFFICE O/P NEW LOW 30 MIN: CPT | Performed by: INTERNAL MEDICINE

## 2024-07-02 PROCEDURE — 3008F BODY MASS INDEX DOCD: CPT | Performed by: INTERNAL MEDICINE

## 2024-07-02 PROCEDURE — 73080 X-RAY EXAM OF ELBOW: CPT | Mod: RIGHT SIDE | Performed by: RADIOLOGY

## 2024-07-02 NOTE — PROGRESS NOTES
New Patient Visit    CC:   Chief Complaint   Patient presents with    Right Arm - New Patient Visit     Injury in 6/28/24  Moving things in garage felt and heard snap in arm  No recent imaging       HPI: Orlando is a 49 y.o. male presents today for evaluation for acute right arm injury sustained last Friday while he was moving items from his garage. He states that he felt a pop at the time of injury. He is here for initial evaluation and x-rays.  Afterwards noticed bruising and swelling.        Review of Systems   GENERAL: Negative for malaise, significant weight loss, fever  MUSCULOSKELETAL: See HPI  NEURO:  Negative for numbness / tingling     Past Medical History  Past Medical History:   Diagnosis Date    Personal history of other diseases of the circulatory system 01/14/2020    History of angina pectoris    Personal history of other specified conditions 01/14/2020    History of palpitations       Medication review  Medication Documentation Review Audit       Reviewed by Kulwant Maxwell DO (Physician) on 06/06/24 at 1634      Medication Order Taking? Sig Documenting Provider Last Dose Status   aspirin 81 mg chewable tablet [id number] Yes Chew 1 tablet (81 mg) once daily. Kulwant Maxwell DO Taking Active   atenolol (Tenormin) 25 mg tablet [id number] Yes TAKE 1 TABLET BY MOUTH ONCE DAILY Armin Nava MD Taking Active   busPIRone (Buspar) 15 mg tablet [id number] Yes Take 1 tablet (15 mg) by mouth 2 times a day. Nery Paniagua PA-C Taking Active   cholecalciferol (Vitamin D-3) 50 mcg (2,000 unit) capsule [id number] Yes Take 1 capsule (50 mcg) by mouth early in the morning.. Historical Provider, MD Taking Active   esomeprazole (NexIUM) 20 mg DR capsule [id number] Yes Take 1 capsule (20 mg) by mouth once daily in the morning. Take before meals. Nery Paniagua PA-C  Active   hydrocortisone-pramoxine (Proctofoam HC) rectal foam [id number] Yes Insert 1 applicator into the rectum 2 times a day. Kulwant Maxwell DO Taking  "Active   icosapent ethyL (Vascepa) 1 gram capsule [id number] Yes Take 1 capsule (1 g) by mouth 2 times a day with meals. Armin Nava MD Taking Active   psyllium seed, with sugar, (FIBER SUPPLEMENT ORAL) [id number] Yes once a day Historical Provider, MD Taking Active   rosuvastatin (Crestor) 20 mg tablet [id number] Yes Take 1 tablet (20 mg) by mouth once daily. Kulwant Maxwell DO Taking Active   sertraline (Zoloft) 25 mg tablet [id number] Yes Take 4 tablets (100 mg) by mouth once daily. Kulwant Maxwell DO Taking Active   sildenafil (Viagra) 100 mg tablet [id number] Yes Take 1 tablet (100 mg) by mouth once daily as needed for erectile dysfunction. Kulwant Maxwell, DO  Active                    Allergies  Allergies   Allergen Reactions    Venlafaxine Other     \"Brain fog\"       Social History  Social History     Socioeconomic History    Marital status:      Spouse name: Not on file    Number of children: Not on file    Years of education: Not on file    Highest education level: Not on file   Occupational History    Not on file   Tobacco Use    Smoking status: Former     Types: Cigarettes    Smokeless tobacco: Never   Substance and Sexual Activity    Alcohol use: Yes     Comment: Monthly.    Drug use: Not Currently     Types: Marijuana    Sexual activity: Defer   Other Topics Concern    Not on file   Social History Narrative    Not on file     Social Determinants of Health     Financial Resource Strain: Not on file   Food Insecurity: Not on file   Transportation Needs: Not on file   Physical Activity: Not on file   Stress: Not on file   Social Connections: Not on file   Intimate Partner Violence: Not on file   Housing Stability: Not on file       Surgical History  Past Surgical History:   Procedure Laterality Date    OTHER SURGICAL HISTORY  01/14/2021    Esophagogastroduodenoscopy    OTHER SURGICAL HISTORY  12/10/2020    Sinus surgery    OTHER SURGICAL HISTORY  12/10/2020    Vasectomy    OTHER SURGICAL HISTORY  " 12/10/2020    Colonoscopy    OTHER SURGICAL HISTORY  12/10/2020    Coronary artery stent placement       Physical Exam:  GENERAL:  Patient is awake, alert, and oriented to person place and time.  Patient appears well nourished and well kept.  Affect Calm, Not Acutely Distressed.  HEENT:  Normocephalic, Atraumatic, EOMI  CARDIOVASCULAR:  Hemodynamically stable.  RESPIRATORY:  Normal respirations with unlabored breathing.  Extremity: Right elbow shows skin is intact.  There is swelling and bruising around the cubital fossa.  He can flex the right elbow to 130 degrees.  Full extension at 0 degrees.  No pain over the lateral or medial epicondyle.  Defect along the biceps tendon and biceps muscle belly, concerning for distal biceps tendon tear difficulty with hook test.  Significant weakness with resistant supination.  Significant weakness with resisted flexion of the right elbow.  Distal triceps tendon tact.  He is neurovascularly intact.  Left elbow was examined for comparison.      Diagnostics: X-rays reviewed      Procedure: None    Assessment: Acute distal bicep tendon rupture    Plan: Orlando presents today for initial evaluation for right arm injury sustained last Friday. X-rays showed no obvious fractures or avulsion fractures.  Has a history and physical examination consistent with acute distal bicep tendon rupture, we recommended stat MRI of the right elbow for preoperative planning. He will follow-up after the MRI. No heavy lifting until follow-up.     No orders of the defined types were placed in this encounter.     At the conclusion of the visit there were no further questions by the patient/family regarding their plan of care.  Patient was instructed to call or return with any issues, questions, or concerns regarding their injury and/or treatment plan described above.     07/02/24 at 1:50 PM - Dharmesh May MD  Scribe Attestation  By signing my name below, IMaurisio Scribe   attest that this  documentation has been prepared under the direction and in the presence of Dharmesh May MD.    Office: [phone]    This note was prepared using voice recognition software.  The details of this note are correct and have been reviewed, and corrected to the best of my ability.  Some grammatical errors may persist related to the Dragon software.

## 2024-07-05 ENCOUNTER — HOSPITAL ENCOUNTER (OUTPATIENT)
Dept: RADIOLOGY | Facility: HOSPITAL | Age: 49
Discharge: HOME | End: 2024-07-05
Payer: COMMERCIAL

## 2024-07-05 DIAGNOSIS — S46.211A BICEPS RUPTURE, DISTAL, RIGHT, INITIAL ENCOUNTER: ICD-10-CM

## 2024-07-05 PROCEDURE — 73221 MRI JOINT UPR EXTREM W/O DYE: CPT | Mod: RT

## 2024-07-09 ENCOUNTER — APPOINTMENT (OUTPATIENT)
Dept: ORTHOPEDIC SURGERY | Facility: CLINIC | Age: 49
End: 2024-07-09
Payer: COMMERCIAL

## 2024-07-09 DIAGNOSIS — S46.211A BICEPS RUPTURE, DISTAL, RIGHT, INITIAL ENCOUNTER: Primary | ICD-10-CM

## 2024-07-09 PROCEDURE — 3008F BODY MASS INDEX DOCD: CPT | Performed by: ORTHOPAEDIC SURGERY

## 2024-07-09 PROCEDURE — 99214 OFFICE O/P EST MOD 30 MIN: CPT | Performed by: ORTHOPAEDIC SURGERY

## 2024-07-09 NOTE — PROGRESS NOTES
"HPI:  Right distal biceps tendon rupture after moving a band saw 2 weeks ago and felt a pop.  Saw my partner who ordered an MRI.  MRI confirmed distal biceps rupture.      Allergies   Allergen Reactions    Venlafaxine Other     \"Brain fog\"       REVIEW OF SYSTEMS  General: Negative for malaise, significant weight loss, fever  Musculoskeletal: See HPI  Neuro:  Negative for numbness/tingling      Medication Documentation Review Audit       Reviewed by Kulwant Maxwell DO (Physician) on 06/06/24 at 1634      Medication Order Taking? Sig Documenting Provider Last Dose Status   aspirin 81 mg chewable tablet [id number] Yes Chew 1 tablet (81 mg) once daily. Kulwant Maxwell DO Taking Active   atenolol (Tenormin) 25 mg tablet [id number] Yes TAKE 1 TABLET BY MOUTH ONCE DAILY Armin Nava MD Taking Active   busPIRone (Buspar) 15 mg tablet [id number] Yes Take 1 tablet (15 mg) by mouth 2 times a day. Nery Paniagua PA-C Taking Active   cholecalciferol (Vitamin D-3) 50 mcg (2,000 unit) capsule [id number] Yes Take 1 capsule (50 mcg) by mouth early in the morning.. Historical Provider, MD Taking Active   esomeprazole (NexIUM) 20 mg DR capsule [id number] Yes Take 1 capsule (20 mg) by mouth once daily in the morning. Take before meals. Nery Paniagua PA-C  Active   hydrocortisone-pramoxine (Proctofoam HC) rectal foam [id number] Yes Insert 1 applicator into the rectum 2 times a day. Kulwant Maxwell DO Taking Active   icosapent ethyL (Vascepa) 1 gram capsule [id number] Yes Take 1 capsule (1 g) by mouth 2 times a day with meals. Armin Nava MD Taking Active   psyllium seed, with sugar, (FIBER SUPPLEMENT ORAL) [id number] Yes once a day Historical Provider, MD Taking Active   rosuvastatin (Crestor) 20 mg tablet [id number] Yes Take 1 tablet (20 mg) by mouth once daily. Kulwant Maxwell DO Taking Active   sertraline (Zoloft) 25 mg tablet [id number] Yes Take 4 tablets (100 mg) by mouth once daily. Kulwant Maxwell DO Taking Active   sildenafil " (Viagra) 100 mg tablet [id number] Yes Take 1 tablet (100 mg) by mouth once daily as needed for erectile dysfunction. Kulwant Maxwell, DO  Active                        PHYSICAL EXAM  General Appearance/Mental Status: A&Ox3, no apparent distress  HEENT: Normal  Lungs: Clear  Heart: RRR  Abdomen: Soft, nontender  Extremities: Abnormal bruising over the medial right elbow, pain with movement, there is tenderness to palpation over the course of the biceps.  The tendon is palpable.    Imaging:  MR elbow right wo IV contrast  Status: Final result     PACS Images - [id number]     Show images for MR elbow right wo IV contrast  Signed by    Signed Time Phone Pager   Marlon Chase MD 7/08/2024 08:39 [phone]     Exam Information    Status Exam Begun Exam Ended   Final 7/05/2024 15:11 7/05/2024 15:59     Study Result    Narrative & Impression   Interpreted By:  Kings Chase,   STUDY:  MR ELBOW RIGHT WO IV CONTRAST; ;  7/5/2024 3:59 pm      INDICATION:  Signs/Symptoms:pain.      COMPARISON:  None.      ACCESSION NUMBER(S):  [id number]      ORDERING CLINICIAN:  ALAN HANSEN      TECHNIQUE:  Multiplanar and multisequential MR images of the right elbow are  performed.      The study is limited by motion degradation in field inhomogeneity.  There is fat saturation posteriorly at the level of the olecranon on  T2 weighted sequences      FINDINGS:  There is no sizable joint effusion.      There is heterogenous increased T2 weighted and decreased T1 weighted  signal with irregular margins in the medial aspect of the tibial head  abutting the posteromedial articular surface. There is no articular  collapse or loose fragment. The appearance is suspicious for  nondisplaced fracture. There are no additional areas of abnormal bone  marrow signal allowing for technical factors described above.      There is ill-defined fluid and edema in the anteromedial subcutaneous  fat. The distal biceps tendon is abnormal. The  distal tendon  demonstrates severe tearing proximally 1.5 cm from the insertion  site. There is ill-defined fluid and edema surrounding the biceps  tendon extending proximally and surrounding the myotendinous  junction. There is some edema at the myotendinous junction. There is  complete or near complete tearing of the distal most tendon extending  to the tendon insertion with focal discontinuity of the tendon side  from a thin curvilinear low signal focus extending to the insertion  site.      The distal triceps tendon and brachialis tendon are intact. The  common flexor and extensor tendon origins are limited by technical  factors. There is no full-thickness discontinuity or retraction.  Similarly, the radial and ulnar collateral ligaments are grossly  intact.      There is no sign of intramuscular mass or fluid collection.      IMPRESSION:  Complete or near complete tearing of the distal biceps tendon  extending to the insertion site.      Nondisplaced subchondral fracture at the posteromedial radial head.      Technical limitations as described above.          MACRO:  None      Signed by: Kings Chase 7/8/2024 8:39 AM  Dictation workstation:   [id number]       Assessment:  Right distal biceps tendon rupture.    Plan:  Right distal biceps tendon repair.  Splane the procedure along with recovery.  The risks, benefits alternatives were reviewed.  We discussed the issue with rupture at the myotendinous junction and possible need for grafting.  Was also explained that the tendon may be irreparable.  I explained the risk of lateral antebrachial cutaneous nerve neuropraxia.  He has a desk job.  I suggest that he take time off until his first postop visit.  He may work at home between surgery and that visit.  Medications and allergies reviewed.

## 2024-07-09 NOTE — H&P (VIEW-ONLY)
HPI:  Right distal biceps tendon rupture after moving a band saw 2 weeks ago and felt a pop.  Saw my partner who ordered an MRI.  MRI confirmed distal biceps rupture.      Allergies   Allergen Reactions   • Venlafaxine Other     \"Brain fog\"       REVIEW OF SYSTEMS  General: Negative for malaise, significant weight loss, fever  Musculoskeletal: See HPI  Neuro:  Negative for numbness/tingling      Medication Documentation Review Audit       Reviewed by Terence Jessica DO (Physician) on 06/06/24 at 1634      Medication Order Taking? Sig Documenting Provider Last Dose Status   aspirin 81 mg chewable tablet [id number] Yes Chew 1 tablet (81 mg) once daily. Terence Jessica DO Taking Active   atenolol (Tenormin) 25 mg tablet [id number] Yes TAKE 1 TABLET BY MOUTH ONCE DAILY Derek Garza MD Taking Active   busPIRone (Buspar) 15 mg tablet [id number] Yes Take 1 tablet (15 mg) by mouth 2 times a day. Tamiko Montelongo PA-C Taking Active   cholecalciferol (Vitamin D-3) 50 mcg (2,000 unit) capsule [id number] Yes Take 1 capsule (50 mcg) by mouth early in the morning.. Historical Provider, MD Taking Active   esomeprazole (NexIUM) 20 mg DR capsule [id number] Yes Take 1 capsule (20 mg) by mouth once daily in the morning. Take before meals. Tamiko Montelongo PA-C  Active   hydrocortisone-pramoxine (Proctofoam HC) rectal foam [id number] Yes Insert 1 applicator into the rectum 2 times a day. Terence Jessica DO Taking Active   icosapent ethyL (Vascepa) 1 gram capsule [id number] Yes Take 1 capsule (1 g) by mouth 2 times a day with meals. Derek Garza MD Taking Active   psyllium seed, with sugar, (FIBER SUPPLEMENT ORAL) [id number] Yes once a day Historical Provider, MD Taking Active   rosuvastatin (Crestor) 20 mg tablet [id number] Yes Take 1 tablet (20 mg) by mouth once daily. Terence Jessica DO Taking Active   sertraline (Zoloft) 25 mg tablet [id number] Yes Take 4 tablets (100 mg) by mouth once daily. Terence Jessica DO Taking Active

## 2024-07-12 ENCOUNTER — HOSPITAL ENCOUNTER (OUTPATIENT)
Dept: PREADMISSION TESTING | Age: 49
Discharge: HOME | End: 2024-07-16
Payer: COMMERCIAL

## 2024-07-12 VITALS
HEART RATE: 67 BPM | HEIGHT: 71 IN | SYSTOLIC BLOOD PRESSURE: 127 MMHG | RESPIRATION RATE: 16 BRPM | TEMPERATURE: 98.5 F | DIASTOLIC BLOOD PRESSURE: 81 MMHG | WEIGHT: 257 LBS | BODY MASS INDEX: 35.98 KG/M2 | OXYGEN SATURATION: 99 %

## 2024-07-12 LAB
ALBUMIN SERPL-MCNC: 4.3 G/DL (ref 3.5–4.6)
ALP SERPL-CCNC: 77 U/L (ref 35–104)
ALT SERPL-CCNC: 37 U/L (ref 0–41)
ANION GAP SERPL CALCULATED.3IONS-SCNC: 13 MEQ/L (ref 9–15)
APTT PPP: 41.2 SEC (ref 24.4–36.8)
AST SERPL-CCNC: 29 U/L (ref 0–40)
BASOPHILS # BLD: 0.1 K/UL (ref 0–0.2)
BASOPHILS NFR BLD: 0.8 %
BILIRUB SERPL-MCNC: 0.5 MG/DL (ref 0.2–0.7)
BUN SERPL-MCNC: 24 MG/DL (ref 6–20)
CALCIUM SERPL-MCNC: 9.4 MG/DL (ref 8.5–9.9)
CHLORIDE SERPL-SCNC: 105 MEQ/L (ref 95–107)
CO2 SERPL-SCNC: 24 MEQ/L (ref 20–31)
CREAT SERPL-MCNC: 1.1 MG/DL (ref 0.7–1.2)
EKG ATRIAL RATE: 56 BPM
EKG P AXIS: 27 DEGREES
EKG P-R INTERVAL: 138 MS
EKG Q-T INTERVAL: 430 MS
EKG QRS DURATION: 86 MS
EKG QTC CALCULATION (BAZETT): 414 MS
EKG R AXIS: 8 DEGREES
EKG T AXIS: 41 DEGREES
EKG VENTRICULAR RATE: 56 BPM
EOSINOPHIL # BLD: 0.3 K/UL (ref 0–0.7)
EOSINOPHIL NFR BLD: 4.1 %
ERYTHROCYTE [DISTWIDTH] IN BLOOD BY AUTOMATED COUNT: 12.1 % (ref 11.5–14.5)
GLOBULIN SER CALC-MCNC: 2.9 G/DL (ref 2.3–3.5)
GLUCOSE SERPL-MCNC: 105 MG/DL (ref 70–99)
HCT VFR BLD AUTO: 45.9 % (ref 42–52)
HGB BLD-MCNC: 15.9 G/DL (ref 14–18)
INR PPP: 1
LYMPHOCYTES # BLD: 2.1 K/UL (ref 1–4.8)
LYMPHOCYTES NFR BLD: 27.2 %
MCH RBC QN AUTO: 30.5 PG (ref 27–31.3)
MCHC RBC AUTO-ENTMCNC: 34.6 % (ref 33–37)
MCV RBC AUTO: 87.9 FL (ref 79–92.2)
MONOCYTES # BLD: 0.6 K/UL (ref 0.2–0.8)
MONOCYTES NFR BLD: 7.5 %
NEUTROPHILS # BLD: 4.6 K/UL (ref 1.4–6.5)
NEUTS SEG NFR BLD: 59.9 %
PLATELET # BLD AUTO: 222 K/UL (ref 130–400)
POTASSIUM SERPL-SCNC: 4.2 MEQ/L (ref 3.4–4.9)
PROT SERPL-MCNC: 7.2 G/DL (ref 6.3–8)
PROTHROMBIN TIME: 13.8 SEC (ref 12.3–14.9)
RBC # BLD AUTO: 5.22 M/UL (ref 4.7–6.1)
SODIUM SERPL-SCNC: 142 MEQ/L (ref 135–144)
WBC # BLD AUTO: 7.7 K/UL (ref 4.8–10.8)

## 2024-07-12 PROCEDURE — 85025 COMPLETE CBC W/AUTO DIFF WBC: CPT

## 2024-07-12 PROCEDURE — 85610 PROTHROMBIN TIME: CPT

## 2024-07-12 PROCEDURE — 85730 THROMBOPLASTIN TIME PARTIAL: CPT

## 2024-07-12 PROCEDURE — 93005 ELECTROCARDIOGRAM TRACING: CPT | Performed by: ORTHOPAEDIC SURGERY

## 2024-07-12 PROCEDURE — 93010 ELECTROCARDIOGRAM REPORT: CPT | Performed by: INTERNAL MEDICINE

## 2024-07-12 PROCEDURE — 80053 COMPREHEN METABOLIC PANEL: CPT

## 2024-07-12 RX ORDER — ICOSAPENT ETHYL 1 G/1
1 CAPSULE ORAL 2 TIMES DAILY
COMMUNITY
Start: 2024-01-15 | End: 2025-01-14

## 2024-07-12 RX ORDER — ROSUVASTATIN CALCIUM 20 MG/1
20 TABLET, COATED ORAL DAILY
COMMUNITY
Start: 2019-12-20

## 2024-07-12 RX ORDER — SERTRALINE HYDROCHLORIDE 25 MG/1
100 TABLET, FILM COATED ORAL DAILY
COMMUNITY
Start: 2023-12-31

## 2024-07-12 RX ORDER — ATENOLOL 25 MG/1
12.5 TABLET ORAL EVERY OTHER DAY
COMMUNITY
Start: 2022-06-16

## 2024-07-12 RX ORDER — ASPIRIN 81 MG/1
81 TABLET, CHEWABLE ORAL DAILY
COMMUNITY
Start: 2022-07-03

## 2024-07-12 RX ORDER — BUSPIRONE HYDROCHLORIDE 15 MG/1
15 TABLET ORAL 2 TIMES DAILY
COMMUNITY
Start: 2023-10-23

## 2024-07-15 ENCOUNTER — APPOINTMENT (OUTPATIENT)
Dept: GENERAL RADIOLOGY | Age: 49
End: 2024-07-15
Attending: ORTHOPAEDIC SURGERY
Payer: COMMERCIAL

## 2024-07-15 ENCOUNTER — ANESTHESIA (OUTPATIENT)
Dept: OPERATING ROOM | Age: 49
End: 2024-07-15
Payer: COMMERCIAL

## 2024-07-15 ENCOUNTER — ANESTHESIA EVENT (OUTPATIENT)
Dept: OPERATING ROOM | Age: 49
End: 2024-07-15
Payer: COMMERCIAL

## 2024-07-15 ENCOUNTER — HOSPITAL ENCOUNTER (OUTPATIENT)
Age: 49
Setting detail: OUTPATIENT SURGERY
Discharge: HOME | End: 2024-07-15
Attending: ORTHOPAEDIC SURGERY | Admitting: ORTHOPAEDIC SURGERY
Payer: COMMERCIAL

## 2024-07-15 VITALS
TEMPERATURE: 97.1 F | HEART RATE: 53 BPM | DIASTOLIC BLOOD PRESSURE: 73 MMHG | OXYGEN SATURATION: 94 % | BODY MASS INDEX: 35 KG/M2 | RESPIRATION RATE: 18 BRPM | WEIGHT: 250 LBS | SYSTOLIC BLOOD PRESSURE: 109 MMHG | HEIGHT: 71 IN

## 2024-07-15 DIAGNOSIS — Z98.890 S/P ORIF (OPEN REDUCTION INTERNAL FIXATION) FRACTURE: Primary | ICD-10-CM

## 2024-07-15 DIAGNOSIS — R52 PAIN: ICD-10-CM

## 2024-07-15 DIAGNOSIS — Z87.81 S/P ORIF (OPEN REDUCTION INTERNAL FIXATION) FRACTURE: Primary | ICD-10-CM

## 2024-07-15 PROCEDURE — 7100000010 HC PHASE II RECOVERY - FIRST 15 MIN: Performed by: ORTHOPAEDIC SURGERY

## 2024-07-15 PROCEDURE — 6360000002 HC RX W HCPCS: Performed by: ORTHOPAEDIC SURGERY

## 2024-07-15 PROCEDURE — C1713 ANCHOR/SCREW BN/BN,TIS/BN: HCPCS | Performed by: ORTHOPAEDIC SURGERY

## 2024-07-15 PROCEDURE — 3700000001 HC ADD 15 MINUTES (ANESTHESIA): Performed by: ORTHOPAEDIC SURGERY

## 2024-07-15 PROCEDURE — 6360000002 HC RX W HCPCS: Performed by: STUDENT IN AN ORGANIZED HEALTH CARE EDUCATION/TRAINING PROGRAM

## 2024-07-15 PROCEDURE — A4217 STERILE WATER/SALINE, 500 ML: HCPCS | Performed by: ORTHOPAEDIC SURGERY

## 2024-07-15 PROCEDURE — 2720000010 HC SURG SUPPLY STERILE: Performed by: ORTHOPAEDIC SURGERY

## 2024-07-15 PROCEDURE — 2580000003 HC RX 258: Performed by: ORTHOPAEDIC SURGERY

## 2024-07-15 PROCEDURE — 7100000001 HC PACU RECOVERY - ADDTL 15 MIN: Performed by: ORTHOPAEDIC SURGERY

## 2024-07-15 PROCEDURE — 64415 NJX AA&/STRD BRCH PLXS IMG: CPT | Performed by: STUDENT IN AN ORGANIZED HEALTH CARE EDUCATION/TRAINING PROGRAM

## 2024-07-15 PROCEDURE — 3700000000 HC ANESTHESIA ATTENDED CARE: Performed by: ORTHOPAEDIC SURGERY

## 2024-07-15 PROCEDURE — 24342 REPAIR OF RUPTURED TENDON: CPT | Performed by: ORTHOPAEDIC SURGERY

## 2024-07-15 PROCEDURE — 2500000003 HC RX 250 WO HCPCS: Performed by: ANESTHESIOLOGIST ASSISTANT

## 2024-07-15 PROCEDURE — 7100000011 HC PHASE II RECOVERY - ADDTL 15 MIN: Performed by: ORTHOPAEDIC SURGERY

## 2024-07-15 PROCEDURE — 3600000004 HC SURGERY LEVEL 4 BASE: Performed by: ORTHOPAEDIC SURGERY

## 2024-07-15 PROCEDURE — 24342 REPAIR OF RUPTURED TENDON: CPT | Performed by: PHYSICIAN ASSISTANT

## 2024-07-15 PROCEDURE — 2500000003 HC RX 250 WO HCPCS: Performed by: STUDENT IN AN ORGANIZED HEALTH CARE EDUCATION/TRAINING PROGRAM

## 2024-07-15 PROCEDURE — 6360000002 HC RX W HCPCS: Performed by: ANESTHESIOLOGIST ASSISTANT

## 2024-07-15 PROCEDURE — 2580000003 HC RX 258: Performed by: STUDENT IN AN ORGANIZED HEALTH CARE EDUCATION/TRAINING PROGRAM

## 2024-07-15 PROCEDURE — 3600000014 HC SURGERY LEVEL 4 ADDTL 15MIN: Performed by: ORTHOPAEDIC SURGERY

## 2024-07-15 PROCEDURE — 7100000000 HC PACU RECOVERY - FIRST 15 MIN: Performed by: ORTHOPAEDIC SURGERY

## 2024-07-15 PROCEDURE — 2709999900 HC NON-CHARGEABLE SUPPLY: Performed by: ORTHOPAEDIC SURGERY

## 2024-07-15 DEVICE — IMPL DELIVERY SYS,DISTAL BICEPS, BC
Type: IMPLANTABLE DEVICE | Site: ARM | Status: FUNCTIONAL
Brand: ARTHREX®

## 2024-07-15 RX ORDER — SODIUM CHLORIDE 9 MG/ML
INJECTION, SOLUTION INTRAVENOUS PRN
Status: DISCONTINUED | OUTPATIENT
Start: 2024-07-15 | End: 2024-07-15 | Stop reason: HOSPADM

## 2024-07-15 RX ORDER — LIDOCAINE HYDROCHLORIDE 20 MG/ML
INJECTION, SOLUTION INTRAVENOUS PRN
Status: DISCONTINUED | OUTPATIENT
Start: 2024-07-15 | End: 2024-07-15 | Stop reason: SDUPTHER

## 2024-07-15 RX ORDER — LIDOCAINE HYDROCHLORIDE 10 MG/ML
1 INJECTION, SOLUTION EPIDURAL; INFILTRATION; INTRACAUDAL; PERINEURAL
Status: COMPLETED | OUTPATIENT
Start: 2024-07-15 | End: 2024-07-15

## 2024-07-15 RX ORDER — ROCURONIUM BROMIDE 10 MG/ML
INJECTION, SOLUTION INTRAVENOUS PRN
Status: DISCONTINUED | OUTPATIENT
Start: 2024-07-15 | End: 2024-07-15 | Stop reason: SDUPTHER

## 2024-07-15 RX ORDER — SODIUM CHLORIDE 0.9 % (FLUSH) 0.9 %
5-40 SYRINGE (ML) INJECTION PRN
Status: DISCONTINUED | OUTPATIENT
Start: 2024-07-15 | End: 2024-07-15 | Stop reason: HOSPADM

## 2024-07-15 RX ORDER — SODIUM CHLORIDE 0.9 % (FLUSH) 0.9 %
5-40 SYRINGE (ML) INJECTION EVERY 12 HOURS SCHEDULED
Status: DISCONTINUED | OUTPATIENT
Start: 2024-07-15 | End: 2024-07-15 | Stop reason: HOSPADM

## 2024-07-15 RX ORDER — FENTANYL CITRATE 0.05 MG/ML
25 INJECTION, SOLUTION INTRAMUSCULAR; INTRAVENOUS EVERY 5 MIN PRN
Status: DISCONTINUED | OUTPATIENT
Start: 2024-07-15 | End: 2024-07-15 | Stop reason: HOSPADM

## 2024-07-15 RX ORDER — MIDAZOLAM HYDROCHLORIDE 1 MG/ML
INJECTION INTRAMUSCULAR; INTRAVENOUS
Status: COMPLETED | OUTPATIENT
Start: 2024-07-15 | End: 2024-07-15

## 2024-07-15 RX ORDER — OXYCODONE HYDROCHLORIDE 5 MG/1
5 TABLET ORAL
Status: DISCONTINUED | OUTPATIENT
Start: 2024-07-15 | End: 2024-07-15 | Stop reason: HOSPADM

## 2024-07-15 RX ORDER — ONDANSETRON 2 MG/ML
4 INJECTION INTRAMUSCULAR; INTRAVENOUS
Status: DISCONTINUED | OUTPATIENT
Start: 2024-07-15 | End: 2024-07-15 | Stop reason: HOSPADM

## 2024-07-15 RX ORDER — OXYCODONE HYDROCHLORIDE 5 MG/1
5 TABLET ORAL EVERY 6 HOURS PRN
Qty: 28 TABLET | Refills: 0 | Status: SHIPPED | OUTPATIENT
Start: 2024-07-15 | End: 2024-07-22

## 2024-07-15 RX ORDER — NALOXONE HYDROCHLORIDE 0.4 MG/ML
INJECTION, SOLUTION INTRAMUSCULAR; INTRAVENOUS; SUBCUTANEOUS PRN
Status: DISCONTINUED | OUTPATIENT
Start: 2024-07-15 | End: 2024-07-15 | Stop reason: HOSPADM

## 2024-07-15 RX ORDER — PROPOFOL 10 MG/ML
INJECTION, EMULSION INTRAVENOUS PRN
Status: DISCONTINUED | OUTPATIENT
Start: 2024-07-15 | End: 2024-07-15 | Stop reason: SDUPTHER

## 2024-07-15 RX ORDER — ROPIVACAINE HYDROCHLORIDE 5 MG/ML
INJECTION, SOLUTION EPIDURAL; INFILTRATION; PERINEURAL
Status: COMPLETED | OUTPATIENT
Start: 2024-07-15 | End: 2024-07-15

## 2024-07-15 RX ORDER — SODIUM CHLORIDE, SODIUM LACTATE, POTASSIUM CHLORIDE, CALCIUM CHLORIDE 600; 310; 30; 20 MG/100ML; MG/100ML; MG/100ML; MG/100ML
INJECTION, SOLUTION INTRAVENOUS CONTINUOUS
Status: DISCONTINUED | OUTPATIENT
Start: 2024-07-15 | End: 2024-07-15 | Stop reason: HOSPADM

## 2024-07-15 RX ORDER — ACETAMINOPHEN 325 MG/1
650 TABLET ORAL
Status: DISCONTINUED | OUTPATIENT
Start: 2024-07-15 | End: 2024-07-15 | Stop reason: HOSPADM

## 2024-07-15 RX ORDER — FENTANYL CITRATE 50 UG/ML
INJECTION, SOLUTION INTRAMUSCULAR; INTRAVENOUS PRN
Status: DISCONTINUED | OUTPATIENT
Start: 2024-07-15 | End: 2024-07-15 | Stop reason: SDUPTHER

## 2024-07-15 RX ORDER — MAGNESIUM HYDROXIDE 1200 MG/15ML
LIQUID ORAL CONTINUOUS PRN
Status: COMPLETED | OUTPATIENT
Start: 2024-07-15 | End: 2024-07-15

## 2024-07-15 RX ADMIN — ROCURONIUM BROMIDE 50 MG: 10 INJECTION INTRAVENOUS at 10:58

## 2024-07-15 RX ADMIN — PROPOFOL 200 MG: 10 INJECTION, EMULSION INTRAVENOUS at 10:58

## 2024-07-15 RX ADMIN — MIDAZOLAM HYDROCHLORIDE 2 MG: 1 INJECTION, SOLUTION INTRAMUSCULAR; INTRAVENOUS at 10:50

## 2024-07-15 RX ADMIN — CEFAZOLIN 2000 MG: 2 INJECTION, POWDER, FOR SOLUTION INTRAMUSCULAR; INTRAVENOUS at 11:08

## 2024-07-15 RX ADMIN — LIDOCAINE HYDROCHLORIDE 1 ML: 10 INJECTION, SOLUTION EPIDURAL; INFILTRATION; INTRACAUDAL; PERINEURAL at 09:45

## 2024-07-15 RX ADMIN — FENTANYL CITRATE 100 MCG: 50 INJECTION, SOLUTION INTRAMUSCULAR; INTRAVENOUS at 10:58

## 2024-07-15 RX ADMIN — ROPIVACAINE HYDROCHLORIDE 20 ML: 5 INJECTION, SOLUTION EPIDURAL; INFILTRATION; PERINEURAL at 10:50

## 2024-07-15 RX ADMIN — PHENYLEPHRINE HYDROCHLORIDE 100 MCG: 10 INJECTION INTRAVENOUS at 11:48

## 2024-07-15 RX ADMIN — SODIUM CHLORIDE, POTASSIUM CHLORIDE, SODIUM LACTATE AND CALCIUM CHLORIDE: 600; 310; 30; 20 INJECTION, SOLUTION INTRAVENOUS at 09:44

## 2024-07-15 RX ADMIN — SUGAMMADEX 200 MG: 100 INJECTION, SOLUTION INTRAVENOUS at 12:01

## 2024-07-15 RX ADMIN — LIDOCAINE HYDROCHLORIDE 25 MG: 20 INJECTION, SOLUTION INTRAVENOUS at 10:58

## 2024-07-15 ASSESSMENT — PAIN DESCRIPTION - LOCATION
LOCATION: ELBOW

## 2024-07-15 ASSESSMENT — PAIN SCALES - GENERAL
PAINLEVEL_OUTOF10: 0

## 2024-07-15 ASSESSMENT — PAIN DESCRIPTION - ORIENTATION
ORIENTATION: RIGHT

## 2024-07-15 NOTE — PROGRESS NOTES
Pt discharged via wheelchair to wife.  All personal belongings RX x1 and discharge instructions taken with patient.

## 2024-07-15 NOTE — ANESTHESIA PRE PROCEDURE
Department of Anesthesiology  Preprocedure Note       Name:  Omari Gonzalez   Age:  49 y.o.  :  1975                                          MRN:  [id number]         Date:  7/15/2024      Surgeon: Surgeon(s):  Jerome García MD    Procedure: Procedure(s):  RIGHT ELBOW DISTAL BICEPS TENDON REPAIR, SCALENE NERVE BLOCK, [employer] C-ARM. RAJAT IS AWARE    Medications prior to admission:   Prior to Admission medications    Medication Sig Start Date End Date Taking? Authorizing Provider   oxyCODONE (ROXICODONE) 5 MG immediate release tablet Take 1 tablet by mouth every 6 hours as needed for Pain for up to 7 days. Intended supply: 7 days. Take lowest dose possible to manage pain Max Daily Amount: 20 mg 7/15/24 7/22/24 Yes Lee Giraldo PA-C   aspirin 81 MG chewable tablet Take 1 tablet by mouth daily 7/3/22   Kristine Tello MD   atenolol (TENORMIN) 25 MG tablet Take 0.5 tablets by mouth every other day 22   Kristine Tello MD   busPIRone (BUSPAR) 15 MG tablet Take 15 mg by mouth 2 times daily 10/23/23   Kristine Tello MD   Cholecalciferol (VITAMIN D-3 PO) Take 2,000 Units by mouth daily    Kristine Tello MD   esomeprazole (NEXIUM) 20 MG delayed release capsule Take 1 capsule by mouth every morning (before breakfast) 24  Kristine Tello MD   FIBER PO Take 2 capsules by mouth nightly    Kristine Tello MD   Icosapent Ethyl (VASCEPA) 1 g CAPS capsule Take 1 g by mouth 2 times daily 1/15/24 1/14/25  Kristine Tello MD   Pramoxine HCl (PROCTOFOAM RE) Place rectally as needed    Kristine Tello MD   rosuvastatin (CRESTOR) 20 MG tablet Take 1 tablet by mouth daily 19   Kristine Tello MD   sertraline (ZOLOFT) 25 MG tablet Take 4 tablets by mouth daily 23   Kristine Tello MD       Current medications:    Current Facility-Administered Medications   Medication Dose Route Frequency Provider Last Rate Last Admin    lactated ringers IV soln

## 2024-07-15 NOTE — INTERVAL H&P NOTE
Update History & Physical    The patient's History and Physical of July 9, 2024 was reviewed with the patient and I examined the patient. There was no change. The surgical site was confirmed by the patient and me.     Plan: The risks, benefits, expected outcome, and alternative to the recommended procedure have been discussed with the patient. Patient understands and wants to proceed with the procedure.     Electronically signed by Jerome García MD on 7/15/2024 at 9:19 AM

## 2024-07-15 NOTE — OP NOTE
Operative Note      Patient: Omari Gonzalez  YOB: 1975  MRN: [id number]    Date of Procedure: 7/15/2024    Pre-Op Diagnosis Codes:     * Biceps rupture, distal, right, initial encounter [S46.211A]    Post-Op Diagnosis: Same       Procedure(s):  RIGHT ELBOW DISTAL BICEPS TENDON REPAIR    Surgeon(s):  Jerome García MD    Assistant:   Physician Assistant: Lee Giraldo PA-C    Anesthesia: General    Estimated Blood Loss (mL): Minimal    Complications: None    Specimens:   * No specimens in log *    Implants:  Implant Name Type Inv. Item Serial No.  Lot No. LRB No. Used Action   KIT IMPL DST BICEPS BTTN INSRT W/ NO2 FIBERLOOP SUT - [id number]  KIT IMPL DST BICEPS BTTN INSRT W/ NO2 FIBERLOOP SUT  [employer] [id number] Right 1 Implanted         Drains: * No LDAs found *    Findings:  Infection Present At Time Of Surgery (PATOS) (choose all levels that have infection present):  No infection present  Other Findings: Ruptured right distal biceps    Detailed Description of Procedure:     Indications: This is a 49-year-old male who sustained a right distal biceps rupture.  There exam was consistent with a distal biceps rupture and an MRI confirmed this.  Because of the patient's activity level and age repair was offered.  The procedure was explained along the risks, benefits alternatives being reviewed.  Potential risk including but not limited to infection, neurovascular complication, lateral antebrachial cutaneous nerve neuropraxia, failure of the repair, pain as well as a potential need for reoperation were all discussed with the patient they consented the procedure.    Operative procedure:  The patient was brought to the operative suite and placed in the supine position.  In the holding area a block was performed per anesthesia.  A MAC anesthetic was administered per anesthesia.  All bony prominences were well-padded.  A tourniquet was placed on the right arm proximally over [place].  A U drape was

## 2024-07-15 NOTE — ANESTHESIA POSTPROCEDURE EVALUATION
[facility]  Postprocedure Note    Patient: Omari Gonzalez  MRN: [id number]  YOB: 1975  Date of evaluation: 7/15/2024    Procedure Summary       Date: 07/15/24 Room / Location: [address]    Anesthesia Start: 1054 Anesthesia Stop: 1212    Procedure: RIGHT ELBOW DISTAL BICEPS TENDON REPAIR (Right) Diagnosis:       Biceps rupture, distal, right, initial encounter      (Biceps rupture, distal, right, initial encounter [S46.211A])    Surgeons: Jerome García MD Responsible Provider: Cindy Reeves MD    Anesthesia Type: general, regional ASA Status: 3            Anesthesia Type: No value filed.    Judith Phase I:      Judith Phase II:      Anesthesia Post Evaluation    Patient location during evaluation: bedside  Patient participation: complete - patient participated  Level of consciousness: awake and awake and alert  Airway patency: patent  Nausea & Vomiting: no nausea and no vomiting  Cardiovascular status: blood pressure returned to baseline and hemodynamically stable  Respiratory status: acceptable  Hydration status: euvolemic  Pain management: adequate        No notable events documented.

## 2024-07-15 NOTE — ANESTHESIA PROCEDURE NOTES
Peripheral Block    Patient location during procedure: pre-op  Reason for block: post-op pain management and at surgeon's request  Start time: 7/15/2024 10:50 AM  End time: 7/15/2024 10:55 AM  Staffing  Performed: anesthesiologist   Anesthesiologist: Cindy Reeves MD  Performed by: Cindy Reeves MD  Authorized by: Cindy Reeves MD    Preanesthetic Checklist  Completed: patient identified, IV checked, site marked, risks and benefits discussed, surgical/procedural consents, equipment checked, pre-op evaluation, timeout performed, anesthesia consent given, oxygen available and monitors applied/VS acknowledged  Peripheral Block   Patient position: supine  Prep: ChloraPrep  Provider prep: mask and sterile gloves  Patient monitoring: cardiac monitor, continuous pulse ox, frequent blood pressure checks and IV access  Block type: Brachial plexus  Supraclavicular  Laterality: right  Injection technique: single-shot  Guidance: ultrasound guided  Local infiltration: lidocaine  Infiltration strength: 1 %  Local infiltration: lidocaine  Dose: 3 mL    Needle   Needle type: combined needle/nerve stimulator   Needle gauge: 22 G  Needle localization: anatomical landmarks, ultrasound guidance and paresthesias  Needle length: 5 cm  Assessment   Injection assessment: negative aspiration for heme, no paresthesia on injection, local visualized surrounding nerve on ultrasound and no intravascular symptoms  Paresthesia pain: immediately resolved  Slow fractionated injection: yes  Hemodynamics: stable  Outcomes: uncomplicated and patient tolerated procedure well    Additional Notes  Ultrasound image in chart.       Consent obtained and timeout performed. Sedation provided with 2mg IV Versed. Site prepped in sterile fashion. Ultrasound used to visualize neurovascular structures and skin anesthetized with 3cc of 1% lidocaine. Needle advanced under US guidance, local anesthetic deposited circumferentially around

## 2024-07-19 ENCOUNTER — APPOINTMENT (OUTPATIENT)
Dept: RADIOLOGY | Facility: HOSPITAL | Age: 49
End: 2024-07-19
Payer: COMMERCIAL

## 2024-07-23 ENCOUNTER — APPOINTMENT (OUTPATIENT)
Dept: ORTHOPEDIC SURGERY | Facility: CLINIC | Age: 49
End: 2024-07-23
Payer: COMMERCIAL

## 2024-07-23 DIAGNOSIS — S46.211A BICEPS RUPTURE, DISTAL, RIGHT, INITIAL ENCOUNTER: ICD-10-CM

## 2024-07-23 DIAGNOSIS — Z09 FOLLOW-UP SURGERY CARE: Primary | ICD-10-CM

## 2024-07-23 PROCEDURE — 99024 POSTOP FOLLOW-UP VISIT: CPT | Performed by: ORTHOPAEDIC SURGERY

## 2024-07-23 PROCEDURE — L3761 EO, ADJ LOCK JOINT PREFAB OT: HCPCS | Performed by: ORTHOPAEDIC SURGERY

## 2024-07-23 NOTE — PROGRESS NOTES
History of Present Illness   Status post right distal biceps repair from 7/15/2024.  He is doing well in his recovery.  His pain is minimal.     Review of Systems   GENERAL: Negative for malaise, significant weight loss, fever  MUSCULOSKELETAL: see HPI  NEURO:  Negative     Past Medical History:   Diagnosis Date    Personal history of other diseases of the circulatory system 01/14/2020    History of angina pectoris    Personal history of other specified conditions 01/14/2020    History of palpitations        Medication Documentation Review Audit       Reviewed by Kulwant Maxwell DO (Physician) on 06/06/24 at 1634      Medication Order Taking? Sig Documenting Provider Last Dose Status   aspirin 81 mg chewable tablet [id number] Yes Chew 1 tablet (81 mg) once daily. Kulwant Maxwell DO Taking Active   atenolol (Tenormin) 25 mg tablet [id number] Yes TAKE 1 TABLET BY MOUTH ONCE DAILY Armin Nava MD Taking Active   busPIRone (Buspar) 15 mg tablet [id number] Yes Take 1 tablet (15 mg) by mouth 2 times a day. Nery Paniagua PA-C Taking Active   cholecalciferol (Vitamin D-3) 50 mcg (2,000 unit) capsule [id number] Yes Take 1 capsule (50 mcg) by mouth early in the morning.. Historical Provider, MD Taking Active   esomeprazole (NexIUM) 20 mg DR capsule [id number] Yes Take 1 capsule (20 mg) by mouth once daily in the morning. Take before meals. Nery Paniagua PA-C  Active   hydrocortisone-pramoxine (Proctofoam HC) rectal foam [id number] Yes Insert 1 applicator into the rectum 2 times a day. Kulwant Maxwell DO Taking Active   icosapent ethyL (Vascepa) 1 gram capsule [id number] Yes Take 1 capsule (1 g) by mouth 2 times a day with meals. Armin Nava MD Taking Active   psyllium seed, with sugar, (FIBER SUPPLEMENT ORAL) [id number] Yes once a day Historical Provider, MD Taking Active   rosuvastatin (Crestor) 20 mg tablet [id number] Yes Take 1 tablet (20 mg) by mouth once daily. Kulwant Maxwell DO Taking Active   sertraline  (Zoloft) 25 mg tablet [id number] Yes Take 4 tablets (100 mg) by mouth once daily. Kulwant Maxwell DO Taking Active   sildenafil (Viagra) 100 mg tablet [id number] Yes Take 1 tablet (100 mg) by mouth once daily as needed for erectile dysfunction. Kulwant Maxwell DO  Active                     Physical Exam  Right elbow  The incision is healing well without any evidence of erythema ecchymosis or effusion  Diffuse swelling about the distal right upper extremity  He is distally neurovascularly intact     Imaging  MR elbow right wo IV contrast  Narrative: Interpreted By:  Kings Chase,   STUDY:  MR ELBOW RIGHT WO IV CONTRAST; ;  7/5/2024 3:59 pm      INDICATION:  Signs/Symptoms:pain.      COMPARISON:  None.      ACCESSION NUMBER(S):  [id number]      ORDERING CLINICIAN:  ALAN HANSEN      TECHNIQUE:  Multiplanar and multisequential MR images of the right elbow are  performed.      The study is limited by motion degradation in field inhomogeneity.  There is fat saturation posteriorly at the level of the olecranon on  T2 weighted sequences      FINDINGS:  There is no sizable joint effusion.      There is heterogenous increased T2 weighted and decreased T1 weighted  signal with irregular margins in the medial aspect of the tibial head  abutting the posteromedial articular surface. There is no articular  collapse or loose fragment. The appearance is suspicious for  nondisplaced fracture. There are no additional areas of abnormal bone  marrow signal allowing for technical factors described above.      There is ill-defined fluid and edema in the anteromedial subcutaneous  fat. The distal biceps tendon is abnormal. The distal tendon  demonstrates severe tearing proximally 1.5 cm from the insertion  site. There is ill-defined fluid and edema surrounding the biceps  tendon extending proximally and surrounding the myotendinous  junction. There is some edema at the myotendinous junction. There is  complete or near complete  tearing of the distal most tendon extending  to the tendon insertion with focal discontinuity of the tendon side  from a thin curvilinear low signal focus extending to the insertion  site.      The distal triceps tendon and brachialis tendon are intact. The  common flexor and extensor tendon origins are limited by technical  factors. There is no full-thickness discontinuity or retraction.  Similarly, the radial and ulnar collateral ligaments are grossly  intact.      There is no sign of intramuscular mass or fluid collection.      Impression: Complete or near complete tearing of the distal biceps tendon  extending to the insertion site.      Nondisplaced subchondral fracture at the posteromedial radial head.      Technical limitations as described above.          MACRO:  None      Signed by: Kings Chase 7/8/2024 8:39 AM  Dictation workstation:   [id number]       Assessment   Status post right distal biceps repair     Plan  Splint and sutures removed  Begin T scope set full flexion to 100 degrees and advance with 10 to 15 degrees of extension weekly until full extension achieved  No active flexion of the elbow  Begin PT  Follow-up in 5 weeks  All questions answered

## 2024-07-29 ENCOUNTER — TELEPHONE (OUTPATIENT)
Dept: ORTHOPEDIC SURGERY | Facility: CLINIC | Age: 49
End: 2024-07-29
Payer: COMMERCIAL

## 2024-07-29 ENCOUNTER — HOSPITAL ENCOUNTER (EMERGENCY)
Facility: HOSPITAL | Age: 49
Discharge: HOME | End: 2024-07-29
Payer: COMMERCIAL

## 2024-07-29 ENCOUNTER — APPOINTMENT (OUTPATIENT)
Dept: CARDIOLOGY | Facility: HOSPITAL | Age: 49
End: 2024-07-29
Payer: COMMERCIAL

## 2024-07-29 VITALS
HEIGHT: 70 IN | HEART RATE: 77 BPM | TEMPERATURE: 97.9 F | SYSTOLIC BLOOD PRESSURE: 116 MMHG | DIASTOLIC BLOOD PRESSURE: 86 MMHG | OXYGEN SATURATION: 99 % | BODY MASS INDEX: 35.79 KG/M2 | RESPIRATION RATE: 16 BRPM | WEIGHT: 250 LBS

## 2024-07-29 DIAGNOSIS — M79.89 OTHER SPECIFIED SOFT TISSUE DISORDERS: ICD-10-CM

## 2024-07-29 DIAGNOSIS — M79.89 SWELLING OF RIGHT UPPER EXTREMITY: Primary | ICD-10-CM

## 2024-07-29 DIAGNOSIS — M79.601 PAIN IN RIGHT ARM: ICD-10-CM

## 2024-07-29 PROCEDURE — 93971 EXTREMITY STUDY: CPT

## 2024-07-29 PROCEDURE — 99283 EMERGENCY DEPT VISIT LOW MDM: CPT | Mod: 25

## 2024-07-29 PROCEDURE — 93971 EXTREMITY STUDY: CPT | Performed by: STUDENT IN AN ORGANIZED HEALTH CARE EDUCATION/TRAINING PROGRAM

## 2024-07-29 PROCEDURE — 99284 EMERGENCY DEPT VISIT MOD MDM: CPT | Mod: 25

## 2024-07-29 PROCEDURE — 2500000001 HC RX 250 WO HCPCS SELF ADMINISTERED DRUGS (ALT 637 FOR MEDICARE OP): Performed by: PHYSICIAN ASSISTANT

## 2024-07-29 RX ORDER — OXYCODONE HYDROCHLORIDE 5 MG/1
5 TABLET ORAL ONCE
Status: COMPLETED | OUTPATIENT
Start: 2024-07-29 | End: 2024-07-29

## 2024-07-29 ASSESSMENT — COLUMBIA-SUICIDE SEVERITY RATING SCALE - C-SSRS
2. HAVE YOU ACTUALLY HAD ANY THOUGHTS OF KILLING YOURSELF?: NO
6. HAVE YOU EVER DONE ANYTHING, STARTED TO DO ANYTHING, OR PREPARED TO DO ANYTHING TO END YOUR LIFE?: NO
1. IN THE PAST MONTH, HAVE YOU WISHED YOU WERE DEAD OR WISHED YOU COULD GO TO SLEEP AND NOT WAKE UP?: NO

## 2024-07-29 ASSESSMENT — PAIN - FUNCTIONAL ASSESSMENT: PAIN_FUNCTIONAL_ASSESSMENT: 0-10

## 2024-07-29 ASSESSMENT — LIFESTYLE VARIABLES
TOTAL SCORE: 0
HAVE YOU EVER FELT YOU SHOULD CUT DOWN ON YOUR DRINKING: NO
HAVE PEOPLE ANNOYED YOU BY CRITICIZING YOUR DRINKING: NO
EVER HAD A DRINK FIRST THING IN THE MORNING TO STEADY YOUR NERVES TO GET RID OF A HANGOVER: NO
EVER FELT BAD OR GUILTY ABOUT YOUR DRINKING: NO

## 2024-07-29 ASSESSMENT — PAIN DESCRIPTION - DESCRIPTORS: DESCRIPTORS: ACHING

## 2024-07-29 ASSESSMENT — PAIN SCALES - GENERAL
PAINLEVEL_OUTOF10: 6
PAINLEVEL_OUTOF10: 6

## 2024-07-29 ASSESSMENT — PAIN DESCRIPTION - FREQUENCY: FREQUENCY: CONSTANT/CONTINUOUS

## 2024-07-29 ASSESSMENT — PAIN DESCRIPTION - ORIENTATION: ORIENTATION: RIGHT

## 2024-07-29 ASSESSMENT — PAIN DESCRIPTION - PAIN TYPE: TYPE: SURGICAL PAIN

## 2024-07-29 ASSESSMENT — PAIN DESCRIPTION - ONSET: ONSET: ONGOING

## 2024-07-29 ASSESSMENT — PAIN DESCRIPTION - PROGRESSION: CLINICAL_PROGRESSION: NOT CHANGED

## 2024-07-29 ASSESSMENT — PAIN DESCRIPTION - LOCATION: LOCATION: ARM

## 2024-07-29 NOTE — TELEPHONE ENCOUNTER
Patient has been having swelling since Friday, he stated that its due to his salt intake he has been taking over the counter waterpills but swelling is still happening. He has been icing and elevating.         Patient would like a refill on pain meds         Talked to barbara and she instructed patient to go to er. Called and said he would go to [facility] .

## 2024-07-29 NOTE — ED PROVIDER NOTES
"HPI   Chief Complaint   Patient presents with    Joint Swelling     Pt states,\" I had right elbow surgery @ out patient @ [facility] by   July 15th, I continue to have swelling in arm, dr wanted to get checked for a blood clot\". Pulses present, denies sob, numbness or tingling sensation intact.        This is a 49-year-old male who is status post right elbow distal biceps tendon repair July 15, 2024 (Dr. Haresh Garza) secondary to biceps tendon rupture presents to emergency department as instructed by his surgeon to rule out DVT of the right upper extremity.  The patient endorses right hand/arm swelling that he believes correlates with his meals.  He has persistent pain as well has some mild to moderate numbness.  He denies any recent injury.  He has no chest pain or shortness of breath.  Has no history of DVT or PE.  He is not on anticoagulation therapy.  He does take a baby aspirin daily.              Patient History   Past Medical History:   Diagnosis Date    Personal history of other diseases of the circulatory system 01/14/2020    History of angina pectoris    Personal history of other specified conditions 01/14/2020    History of palpitations     Past Surgical History:   Procedure Laterality Date    OTHER SURGICAL HISTORY  01/14/2021    Esophagogastroduodenoscopy    OTHER SURGICAL HISTORY  12/10/2020    Sinus surgery    OTHER SURGICAL HISTORY  12/10/2020    Vasectomy    OTHER SURGICAL HISTORY  12/10/2020    Colonoscopy    OTHER SURGICAL HISTORY  12/10/2020    Coronary artery stent placement     Family History   Problem Relation Name Age of Onset    Supraventricular tachycardia Mother      Heart disease Father      Heart attack Brother      Other (Hole in Heart.) Mother's Brother      Heart murmur Son Andrei      Social History     Tobacco Use    Smoking status: Former     Types: Cigarettes    Smokeless tobacco: Never   Substance Use Topics    Alcohol use: Yes     Comment: Monthly.    Drug use: Not " Currently     Types: Marijuana       Physical Exam   ED Triage Vitals [07/29/24 1835]   Temperature Heart Rate Respirations BP   36.6 °C (97.9 °F) 86 18 113/75      Pulse Ox Temp Source Heart Rate Source Patient Position   98 % Tympanic Monitor Sitting      BP Location FiO2 (%)     Left arm --       Physical Exam  Vitals reviewed.   Constitutional:       Appearance: Normal appearance.   Cardiovascular:      Rate and Rhythm: Normal rate and regular rhythm.   Pulmonary:      Effort: Pulmonary effort is normal.   Musculoskeletal:      Comments: Mild to moderate swelling of the right upper extremity/hand, sensation intact, palpable radial pulse, full range of motion of all joints of the right wrist and hand   Skin:     General: Skin is warm.   Neurological:      Mental Status: He is alert.           ED Course & MDM   ED Course as of 07/30/24 1530 Mon Jul 29, 2024 2202 Vascular US upper extremity venous duplex right [SH]      ED Course User Index  [SH] Janusz Aguilar PA-C         Diagnoses as of 07/30/24 1530   Swelling of right upper extremity                       [place] Coma Scale Score: 15                        Medical Decision Making  49-year-old male, is alert and oriented x 3, afebrile and hemodynamically stable presenting with right upper extremity swelling in setting of postoperative state status post right tendon repair July 15, 2024.    Examination revealing mild to moderate swelling of the right hand/upper extremity, otherwise pulse, motor, sensation is intact.    The patient was ordered for an ultrasound of the right upper extremity to rule out thrombosis.  Signed out to incoming team pending ultrasound result/disposition.    Ultrasound negative for DVT.  The patient is stable for discharge home.  Advised to follow-up with his orthopedic surgeon for reevaluation.        Procedure  Procedures     Janusz Aguilar PA-C  07/30/24 1530

## 2024-07-29 NOTE — ED TRIAGE NOTES
"Pt states,\" I had right elbow surgery @ out patient @ [facility] by   July 15th, I continue to have swelling in arm, dr wanted to get checked for a blood clot\". Pulses present, denies sob, numbness or tingling sensation intact.   "

## 2024-07-30 ENCOUNTER — EVALUATION (OUTPATIENT)
Dept: PHYSICAL THERAPY | Facility: HOSPITAL | Age: 49
End: 2024-07-30
Payer: COMMERCIAL

## 2024-07-30 DIAGNOSIS — Z09 FOLLOW-UP SURGERY CARE: ICD-10-CM

## 2024-07-30 DIAGNOSIS — S46.211A BICEPS RUPTURE, DISTAL, RIGHT, INITIAL ENCOUNTER: ICD-10-CM

## 2024-07-30 DIAGNOSIS — Z09 FOLLOW-UP SURGERY CARE: Primary | ICD-10-CM

## 2024-07-30 PROCEDURE — 97161 PT EVAL LOW COMPLEX 20 MIN: CPT | Mod: GP

## 2024-07-30 PROCEDURE — 97140 MANUAL THERAPY 1/> REGIONS: CPT | Mod: GP

## 2024-07-30 RX ORDER — OXYCODONE HYDROCHLORIDE 5 MG/1
5 TABLET ORAL EVERY 8 HOURS
Qty: 21 TABLET | Refills: 0 | Status: SHIPPED | OUTPATIENT
Start: 2024-07-30 | End: 2024-08-06

## 2024-07-30 ASSESSMENT — PAIN DESCRIPTION - DESCRIPTORS: DESCRIPTORS: THROBBING

## 2024-07-30 ASSESSMENT — PAIN - FUNCTIONAL ASSESSMENT: PAIN_FUNCTIONAL_ASSESSMENT: 0-10

## 2024-07-30 ASSESSMENT — PAIN SCALES - GENERAL: PAINLEVEL_OUTOF10: 4

## 2024-07-30 NOTE — TELEPHONE ENCOUNTER
Patient was called, he was seen in the ED yesterday.  His ultrasound was negative for DVT.  He is more comfortable now.  He was advised to continue to ice and elevate.  He was instructed to call with any other questions or problems.  He will keep his follow up appointment.

## 2024-07-30 NOTE — PROGRESS NOTES
Physical Therapy    Physical Therapy Evaluation and Treatment      Patient Name: Orlando Washington  MRN: [id number]  Today's Date: 7/30/2024    Time Entry:   Time Calculation  Start Time: 0842  Stop Time: 0914  Time Calculation (min): 32 min  PT Evaluation Time Entry  PT Evaluation (Low) Time Entry: 15  PT Therapeutic Procedures Time Entry  Manual Therapy Time Entry: 9  Therapeutic Exercise Time Entry: 8                 Insurance:  [employer] PT OT COPAY 0 DED 0   COVERAGE 100 OOP 4500(0) 9500(0) NO AUTH REQ   REF# JOSIAS W [id number]/ALL     Visit: 1    Assessment:  PT Assessment  PT Assessment Results: Decreased strength, Decreased range of motion, Decreased endurance, Decreased mobility, Orthopedic restrictions, Pain  Rehab Prognosis: Good  Evaluation/Treatment Tolerance: Patient tolerated treatment well  Strengths: Ability to acquire knowledge, Attitude of self     Patient is a  50 y/o male presents with s/p R distal bicep repair. Upon examination patient demonstrates decreased ROM and strength R elbow with orthopedic precautions limiting overall functional mobility including ability to utilize R UE in a normal manner for all ADLs and work. Pt to benefit from outpatient PT to address deficits, maximize functional mobility and improve QOL.  The clinical presentation of this patient is stable and their history and examination findings are consistent with a low complexity evaluation with good rehab potential.        Plan:  OP PT Plan  Treatment/Interventions: Cryotherapy, Dry needling, Education/ Instruction, Manual therapy, Neuromuscular re-education, Self care/ home management, Taping techniques, Therapeutic activities, Therapeutic exercises, Ultrasound  PT Plan: Skilled PT  PT Frequency:  (1x/week until 8/27 then 2x/week for 10 visits)  Onset Date: 07/15/24  Rehab Potential: Good  Plan of Care Agreement: Patient    Current Problem:   1. Follow-up surgery care  Referral to Physical Therapy      2.  Biceps rupture, distal, right, initial encounter  Referral to Physical Therapy          Subjective    General:  General  Reason for Referral: R distal bicep repair  Referred By: Dr. Haresh Garza  Past Medical History Relevant to Rehab: cheste pain, heart disease, cardiac stent  Patient is s/p R distal bicep repair 7/15/24.   Patient injured R arm working in his garage 2 weeks before surgery.  He had tingling in R forearm and digits 2-5 yesterday. No symptoms today.   Taking narcotic for painfree- but has non left  He has been icing R arm. He has also been elevating R arm     Went to ED 7/29/24 d/t edema R UE . (-) for DVT  Precautions:  Precautions  Precautions Comment: Begin elbow extension, TSCOPE set full flexion to 100 degrees extension, to be opened up 10 degrees of extension per week     Pain:  Pain Assessment  Pain Assessment: 0-10  0-10 (Numeric) Pain Score: 4  Pain Type: Surgical pain  Pain Location: Elbow  Pain Descriptors: Throbbing  Home Living: spouse     Prior Level of Function: employed as a   R hand dominant        Objective           PALPATION: radial styloid process, palmaris longus            POSTURE: forward head, rounded shoulders    Healing incisions, minimal erythema, mod edema in forearm, wrist, hand    AROM    Right shoulder flexion: WFL      Left shoulder flexion: WFL      Right shoulder abduction: WFL      Left shoulder abduction: WFL      Right shoulder extension: NT     Left shoulder extension: NT      Right shoulder ER: WFL     Left shoulder ER: WFL      Right shoulder IR: NT     Left shoulder IR: NT      Right HBB: NT      Left HBB: NT     Right wrist flexion:   Right wrist extension:    PROM   R elbow flexion: 110  R elbow extension: 70  R elbow pronation: limited not measured  R elbow supination: limited not measured         MMT    Deltoid flexion right: NT     Deltoid flexion left: 5/5      Deltoid abduction right: NT      Deltoid abduction left: 5/5      ER  @ 0 degrees abduction right: NT     ER @ 0 degrees abduction left: 5/5      IR @ 0 degrees abduction right: NT     IR @ 0 degrees abduction left: 5/5          Outcome Measures:  Other Measures  Disability of Arm Shoulder Hand (DASH): 52 (93.18% Quick DASH)     Treatments:  Manual  PROM R elbow flexion/extension within range of brace   PROM R elbow pronation/supination     HEP  Wrist AROM flex/extension    AROM shoulder flexion/abduction/ER    EDUCATION:  Outpatient Education  Individual(s) Educated: Patient  Education Provided: Anatomy, Home Exercise Program, Physiology, POC, Post-Op Precautions  Risk and Benefits Discussed with Patient/Caregiver/Other: yes  Patient/Caregiver Demonstrated Understanding: yes  Plan of Care Discussed and Agreed Upon: yes  Patient Response to Education: Patient/Caregiver Verbalized Understanding of Information    Goals:  1. Patient will be independent with HEP  2. Patient will demonstrate R elbow AROM WNL   3. Patient will demonstrate R elbow strength >/=4/5 to be able to lift and carry   4. Patient will demonstrate >/=50% improvement in Quick DASH demonstrating improved functional mobility

## 2024-08-05 ENCOUNTER — TREATMENT (OUTPATIENT)
Dept: PHYSICAL THERAPY | Facility: HOSPITAL | Age: 49
End: 2024-08-05
Payer: COMMERCIAL

## 2024-08-05 DIAGNOSIS — S46.211A BICEPS RUPTURE, DISTAL, RIGHT, INITIAL ENCOUNTER: ICD-10-CM

## 2024-08-05 DIAGNOSIS — Z09 FOLLOW-UP SURGERY CARE: Primary | ICD-10-CM

## 2024-08-05 PROCEDURE — 97140 MANUAL THERAPY 1/> REGIONS: CPT | Mod: GP,CQ

## 2024-08-05 ASSESSMENT — PAIN DESCRIPTION - DESCRIPTORS: DESCRIPTORS: TIGHTNESS

## 2024-08-05 ASSESSMENT — PAIN - FUNCTIONAL ASSESSMENT: PAIN_FUNCTIONAL_ASSESSMENT: 0-10

## 2024-08-05 NOTE — PROGRESS NOTES
Physical Therapy Treatment    Patient Name: Orlando Washington  MRN: [id number]  Today's Date: 8/5/2024  Time Calculation  Start Time: 0739  Stop Time: 0757  Time Calculation (min): 18 min    Current Problem  1. Follow-up surgery care        2. Biceps rupture, distal, right, initial encounter            Insurance:  [employer] BM[employer] PT OT COPAY 0 DED 0   COVERAGE 100 OOP 4500(0) 9500(0) NO AUTH REQ   REF# JOSIAS PENN [id number]/ALL      Visit: 2/10    Subjective   General  Pt stating his elbow is doing okay, just feels stiffness.   Precautions  Precautions  Precautions Comment: Begin elbow extension, TSCOPE set full flexion to 100 degrees extension, to be opened up 10 degrees of extension per week  Pain  Pain Assessment: 0-10  Pain Location: Elbow  Pain Descriptors: Tightness    Objective   Elbow ext 70  Treatments:  PROM performed into elbow ext, flexion, and forearm supination.     Assessment:  Decreased elbow ext per protocol. PROM performed to increase ROM and improve mobility. Pt reporting pulling at end range extension but tolerable. Educated pt on arm swing during gait and continuing to his the ball to help with swelling.     Plan:  Cont per protocol

## 2024-08-06 ENCOUNTER — APPOINTMENT (OUTPATIENT)
Dept: PHYSICAL THERAPY | Facility: HOSPITAL | Age: 49
End: 2024-08-06
Payer: COMMERCIAL

## 2024-08-07 ENCOUNTER — APPOINTMENT (OUTPATIENT)
Dept: PHYSICAL THERAPY | Facility: HOSPITAL | Age: 49
End: 2024-08-07
Payer: COMMERCIAL

## 2024-08-12 ENCOUNTER — TREATMENT (OUTPATIENT)
Dept: PHYSICAL THERAPY | Facility: HOSPITAL | Age: 49
End: 2024-08-12
Payer: COMMERCIAL

## 2024-08-12 DIAGNOSIS — Z09 FOLLOW-UP SURGERY CARE: Primary | ICD-10-CM

## 2024-08-12 DIAGNOSIS — S46.211A BICEPS RUPTURE, DISTAL, RIGHT, INITIAL ENCOUNTER: ICD-10-CM

## 2024-08-12 PROCEDURE — 97140 MANUAL THERAPY 1/> REGIONS: CPT | Mod: GP,CQ

## 2024-08-12 ASSESSMENT — PAIN SCALES - GENERAL: PAINLEVEL_OUTOF10: 0 - NO PAIN

## 2024-08-12 ASSESSMENT — PAIN - FUNCTIONAL ASSESSMENT: PAIN_FUNCTIONAL_ASSESSMENT: 0-10

## 2024-08-12 NOTE — PROGRESS NOTES
Physical Therapy Treatment    Patient Name: Orlando Washington  MRN: [id number]  Today's Date: 8/12/2024     PT Therapeutic Procedures Time Entry  Manual Therapy Time Entry: 23                   Current Problem  1. Follow-up surgery care        2. Biceps rupture, distal, right, initial encounter            Insurance   [employer] PT OT COPAY 0 DED 0   COVERAGE 100 OOP 4500(0) 9500(0) NO AUTH REQ   REF# JOSIAS W [id number]/ALL      Visit: 2/10  Precautions  Precautions  Precautions Comment: Begin elbow extension, TSCOPE set full flexion to 100 degrees extension, to be opened up 10 degrees of extension per week      Subjective   Pt stating his elbow has been doing good. Has been doing his HEP which helps his wrist feel more lose.     Pain  Pain Assessment: 0-10  0-10 (Numeric) Pain Score: 0 - No pain      Objective   Elbow ext 50  Treatments:  PROM performed into elbow ext, flexion, and forearm supination.     Assessment:  Decreased elbow ext per protocol. PROM performed to increase ROM and improve mobility. Pt reporting pulling at end range extension but tolerable. Also noticed pt tight into supination today. Updated HEP with elbow extension stretch in his brace to help with ROM.     Plan:  Cont to progress ROM per protocol    HEP:   Access Code: [email or web address]  URL: [email or web address]  Date: 08/12/2024  Prepared by: Lainey Haider    Exercises  - Supine Elbow Extension Stretch in Supination  - 1 x daily - 7 x weekly - 10 reps - 1 hold

## 2024-08-14 ENCOUNTER — APPOINTMENT (OUTPATIENT)
Dept: PHYSICAL THERAPY | Facility: HOSPITAL | Age: 49
End: 2024-08-14
Payer: COMMERCIAL

## 2024-08-26 ENCOUNTER — TREATMENT (OUTPATIENT)
Dept: PHYSICAL THERAPY | Facility: HOSPITAL | Age: 49
End: 2024-08-26
Payer: COMMERCIAL

## 2024-08-26 DIAGNOSIS — S46.211A BICEPS RUPTURE, DISTAL, RIGHT, INITIAL ENCOUNTER: Primary | ICD-10-CM

## 2024-08-26 PROCEDURE — 97140 MANUAL THERAPY 1/> REGIONS: CPT | Mod: GP,CQ

## 2024-08-26 ASSESSMENT — PAIN SCALES - GENERAL: PAINLEVEL_OUTOF10: 0 - NO PAIN

## 2024-08-26 ASSESSMENT — PAIN - FUNCTIONAL ASSESSMENT: PAIN_FUNCTIONAL_ASSESSMENT: 0-10

## 2024-08-26 NOTE — PROGRESS NOTES
Physical Therapy Treatment    Patient Name: Orlando Washington  MRN: [id number]  Today's Date: 8/26/2024     PT Therapeutic Procedures Time Entry  Manual Therapy Time Entry: 15                   Current Problem  1. Biceps rupture, distal, right, initial encounter            Insurance   [employer] BM[employer] PT OT COPAY 0 DED 0   COVERAGE 100 OOP 4500(0) 9500(0) NO AUTH REQ   REF# JOSIAS W [id number]/ALL      Visit: 4/10    Precautions  Precautions  Precautions Comment: Begin elbow extension, TSCOPE set full flexion to 100 degrees extension, to be opened up 10 degrees of extension per week      Subjective       Pain  Pain Assessment: 0-10  0-10 (Numeric) Pain Score: 0 - No pain      Objective   40 ext  Treatments:    PROM performed into elbow ext, flexion, and forearm supination     Assessment:  Decreased elbow ext per protocol. PROM performed to increase ROM and improve mobility. Pt reporting pulling at end range but tolerable. Also noticed Pt tight into pronation today. Follows up with Dr. Garza tomorrow.       Plan:  Cont to progress per protocol

## 2024-08-27 ENCOUNTER — APPOINTMENT (OUTPATIENT)
Dept: ORTHOPEDIC SURGERY | Facility: CLINIC | Age: 49
End: 2024-08-27
Payer: COMMERCIAL

## 2024-08-27 DIAGNOSIS — Z09 FOLLOW-UP SURGERY CARE: Primary | ICD-10-CM

## 2024-08-27 PROCEDURE — 99024 POSTOP FOLLOW-UP VISIT: CPT | Performed by: ORTHOPAEDIC SURGERY

## 2024-08-27 PROCEDURE — 1036F TOBACCO NON-USER: CPT | Performed by: ORTHOPAEDIC SURGERY

## 2024-08-27 NOTE — PROGRESS NOTES
History of Present Illness   The patient is here for his right distal biceps repair.  He has some pain in the forearm when he supinates.  He is approximately 6 weeks postop.     Review of Systems   GENERAL: Negative for malaise, significant weight loss, fever  MUSCULOSKELETAL: see HPI  NEURO:  Negative     Past Medical History:   Diagnosis Date    Personal history of other diseases of the circulatory system 01/14/2020    History of angina pectoris    Personal history of other specified conditions 01/14/2020    History of palpitations        Medication Documentation Review Audit       Reviewed by Fidelia Monahan RN (Registered Nurse) on 07/29/24 at 1843      Medication Order Taking? Sig Documenting Provider Last Dose Status   aspirin 81 mg chewable tablet [id number]  Chew 1 tablet (81 mg) once daily. Kulwant Maxwell DO  Active   atenolol (Tenormin) 25 mg tablet [id number]  TAKE 1 TABLET BY MOUTH ONCE DAILY Armin Nava MD  Active   busPIRone (Buspar) 15 mg tablet [id number]  Take 1 tablet (15 mg) by mouth 2 times a day. Nery Paniagua PA-C  Active   cholecalciferol (Vitamin D-3) 50 mcg (2,000 unit) capsule [id number]  Take 1 capsule (50 mcg) by mouth early in the morning.. Historical Provider, MD  Active   esomeprazole (NexIUM) 20 mg DR capsule [id number]  Take 1 capsule (20 mg) by mouth once daily in the morning. Take before meals. Kulwant Maxwell DO  Active   hydrocortisone-pramoxine (Proctofoam HC) rectal foam [id number]  Insert 1 applicator into the rectum 2 times a day. Kulwant Maxwell DO  Active   icosapent ethyL (Vascepa) 1 gram capsule [id number]  Take 1 capsule (1 g) by mouth 2 times a day with meals. Armin Nava MD  Active   psyllium seed, with sugar, (FIBER SUPPLEMENT ORAL) [id number]  once a day Historical Provider, MD  Active   rosuvastatin (Crestor) 20 mg tablet [id number]  Take 1 tablet (20 mg) by mouth once daily. Kulwant Maxwell DO  Active   sertraline (Zoloft) 25 mg tablet [id number]   [facility] PAN SOCIAL WORK NOTE     Patient ID: Karina is a 63 year old female.seen for: CHF, difficulty walking, transient cerebral ischemic attack.     Primary Care Giver: Eduardo Alan Phone number: [phone]  Address: [address] [place] [address]        ADVANCE DIRECTIVES:  Power of  Status:  Not Activated  Code Status:  not specified  Advanced Directive Forms: discussed     REASON FOR VISIT/CC: Supportive counseling/community resources     History of Present Illness      Coping status: good  Mental Status: good  Support System: good     Financial      Financial Status: adequate  Comments: States income is adequate to monthly budget     Goals     Patient's/Family Goals: Continue to live at home with family support. Maintain progress with PT and continue to walk a few steps with a family member walking behind her with wc.  Assessment: Writer met with patient for home visit to provide supportive counseling. Patient verbalized progress. Stated that depression has decreased. Explained that when writer began meeting with patient, she had not taken a shower in over a month. Stated that she now takes showers regularly.    Stated that she \"feels much better\" than she has in a while. Explained she has less worries and less feelings of sadness.    Reported that goal is to be able to leave he house at her leisure.     Patient acknowledged fear of working towards goal. Writer actively listened.    Writer acknowledged a lot of negative self talk observed from patient.    Educated on positive self-talk. Encouraged patient to begin recognizing when she is practicing negative self talk. When recognized, then alter talk to postiive self-talk. Explained that patient is working on changing her thought recordings.     Encouraged patient to move to and from commode daily. Discussed accountability. Patient verbalized that she recognizes her mobility is caused by her actions. Verbalized wanting to work on taking  Take 4 tablets (100 mg) by mouth once daily. Kulwant Maxwell DO  Active   sildenafil (Viagra) 100 mg tablet [id number]  Take 1 tablet (100 mg) by mouth once daily as needed for erectile dysfunction. Kulwant Maxwell DO   24 6623                     Physical Exam  This is a male in no acute distress  He lacks a degree or 2 to full extension at the right elbow and lacks some supination.  There is some subjective diminished sensation along the lateral antebrachial cutaneous nerve distribution.  No tenderness over the repair and the repair feels intact.     Imaging  Vascular US upper extremity venous duplex right              [facility]  [address] [place] [address]      Tel [phone] Fax [phone]       [facility]     [facility]    Patient Name:      MADDY Warner Physician:  [id number] Frances Man MD  Study Date:        2024            Ordering Provider:  [id number] CALEB MCNULTY  MRN/PID:           [id number]             Fellow:  Accession#:        [id number]         Technologist:       Herbie SOLORZANO RVT  Date of Birth/Age: 1975 / 49 years Technologist 2:  Gender:            M                    Encounter#:         [id number]  Admission Status:  Emergency            Location Performed: [facility]       Diagnosis/ICD: Right arm swelling-M79.89; Pain in right arm-M79.601  Indication:    Limb edema  CPT Codes:     13417 Peripheral venous duplex scan for DVT Limited       Pertinent History: CAD, Hyperlipidemia and Recent Surgery.       CONCLUSIONS:  Right Upper Venous: No evidence of acute deep vein thrombus visualized in the right upper extremity. Additional Findings; Cystic Structure. There is evidence of a right forearm hematoma near recent surgery incision ~ 2.6cm x 1.7cm x  accountability.    Home Visit: 10a-11a         Plan  Needs: Continue to follow to provide supportive counseling and ensure patient is compliant with medical advice from APN and RN.   Referral:  None, at this time.  :  Allyson Justice LCSW  Phone Number: [phone]      Allyson Justice LCSW   .6cm.  Left Upper Venous: The subclavian vein demonstrates a normal spontaneous and phasic flow.     Imaging & Doppler Findings:     Right               Compressible Thrombus        Flow  Internal Jugular        Yes        None   Spontaneous/Phasic  Subclavian Proximal     Yes        None   Spontaneous/Phasic  Subclavian Mid          Yes        None  Subclavian Distal       Yes        None   Spontaneous/Phasic  Axillary                Yes        None   Spontaneous/Phasic  Brachial                Yes        None  Cephalic                Yes        None  Basilic                 Yes        None       Left                       Flow  Subclavian Proximal Spontaneous/Phasic       [id number] Frances Man MD  Electronically signed by [id number] Frances Man MD on 7/30/2024 at 3:23:54 PM       ** Final **       Assessment   Status post right distal biceps repair     Plan  He May discontinue the brace  Continue therapy to work on range of motion and begin strengthening  Follow-up in 6 weeks  Anticipate return to work at that time  Observe the lateral antebrachial cutaneous nerve  All questions were answered    Procedures

## 2024-08-28 ENCOUNTER — TREATMENT (OUTPATIENT)
Dept: PHYSICAL THERAPY | Facility: HOSPITAL | Age: 49
End: 2024-08-28
Payer: COMMERCIAL

## 2024-08-28 DIAGNOSIS — S46.211A BICEPS RUPTURE, DISTAL, RIGHT, INITIAL ENCOUNTER: Primary | ICD-10-CM

## 2024-08-28 PROCEDURE — 97140 MANUAL THERAPY 1/> REGIONS: CPT | Mod: GP,CQ

## 2024-08-28 PROCEDURE — 97110 THERAPEUTIC EXERCISES: CPT | Mod: GP,CQ

## 2024-08-28 ASSESSMENT — PAIN - FUNCTIONAL ASSESSMENT: PAIN_FUNCTIONAL_ASSESSMENT: 0-10

## 2024-08-28 ASSESSMENT — PAIN SCALES - GENERAL: PAINLEVEL_OUTOF10: 0 - NO PAIN

## 2024-08-28 NOTE — PROGRESS NOTES
Physical Therapy Treatment    Patient Name: Orlando Washington  MRN: [id number]  Today's Date: 8/28/2024     PT Therapeutic Procedures Time Entry  Manual Therapy Time Entry: 8  Therapeutic Exercise Time Entry: 33                   Current Problem  1. Biceps rupture, distal, right, initial encounter            Insurance   [employer] BM[employer] PT OT COPAY 0 DED 0   COVERAGE 100 OOP 4500(0) 9500(0) NO AUTH REQ   REF# JOSIAS W [id number]/ALL      Visit: 5/10    Precautions  Precautions  Precautions Comment: 8/27: discontinue the brace work on ROM and may begin strengthening      Subjective   Pt stating his follow up went well with Dr. Garza.     Pain  Pain Assessment: 0-10  0-10 (Numeric) Pain Score: 0 - No pain      Objective   Elbow PROM   -3-132  Treatments:  UBE no resistance F/R 3'/3'  Wrist flex/ext 2# 2x10  Eccentric pronation/supination 2# 2x10  Therabar yellow 3 way 2x10  Rows/LAE GTB 2x10  3 way bicep curl x10      Manual:  Scar massage and PROM  Assessment:  Pt received new orders, started light strengthening  and ROM today with muscle fatigue reported. Pt displays good PROM with minimal tightness. Talked about scar massage and using dycem to help with the scar starting to keloid. Updated HEP and reviewed.      Plan:  Cont to slowly progress strength     HEP:  Access Code: [email or web address]  URL: [email or web address]  Date: 08/28/2024  Prepared by: Lainey Haider    Exercises  - Wrist Flexion with Resistance  - 1 x daily - 7 x weekly - 2 sets - 10 reps  - Wrist Extension with Resistance  - 1 x daily - 7 x weekly - 2 sets - 10 reps  - Seated Wrist Supination Pronation with Can  - 1 x daily - 7 x weekly - 2 sets - 10 reps  - Standing Row with Anchored Resistance  - 1 x daily - 7 x weekly - 2 sets - 10 reps  - Shoulder extension with resistance - Neutral  - 1 x daily - 7 x weekly - 2 sets - 10 reps  - Standing Bicep Curls Supinated with Dumbbells  - 1 x daily - 7 x weekly - 10 reps  - Standing  Bicep Curls Neutral with Dumbbells  - 1 x daily - 7 x weekly - 10 reps  - Standing Pronated Elbow Flexion with Dumbbell  - 1 x daily - 7 x weekly - 10 reps

## 2024-09-04 ENCOUNTER — TREATMENT (OUTPATIENT)
Dept: PHYSICAL THERAPY | Facility: HOSPITAL | Age: 49
End: 2024-09-04
Payer: COMMERCIAL

## 2024-09-04 DIAGNOSIS — S46.211A BICEPS RUPTURE, DISTAL, RIGHT, INITIAL ENCOUNTER: Primary | ICD-10-CM

## 2024-09-04 PROCEDURE — 97140 MANUAL THERAPY 1/> REGIONS: CPT | Mod: GP,CQ

## 2024-09-04 PROCEDURE — 97110 THERAPEUTIC EXERCISES: CPT | Mod: GP,CQ

## 2024-09-04 ASSESSMENT — PAIN SCALES - GENERAL: PAINLEVEL_OUTOF10: 0 - NO PAIN

## 2024-09-04 ASSESSMENT — PAIN - FUNCTIONAL ASSESSMENT: PAIN_FUNCTIONAL_ASSESSMENT: 0-10

## 2024-09-04 NOTE — PROGRESS NOTES
Physical Therapy Treatment    Patient Name: Orlando Washington  MRN: [id number]  Today's Date: 9/4/2024     PT Therapeutic Procedures Time Entry  Manual Therapy Time Entry: 8  Therapeutic Exercise Time Entry: 35                   Current Problem  1. Biceps rupture, distal, right, initial encounter            Insurance   [employer] BMN PT OT COPAY 0 DED 0   COVERAGE 100 OOP 4500(0) 9500(0) NO AUTH REQ   REF# JOSIAS W [id number]/ALL      Visit: 6/10  Precautions  Precautions  Precautions Comment: 8/27: discontinue the brace work on ROM and may begin strengthening      Subjective   Pt reporting soreness.     Pain  Pain Assessment: 0-10  0-10 (Numeric) Pain Score: 0 - No pain      Objective   0-136  Treatments:  UBE no resistance F/R 3'/3'  Wrist flex/ext 2# 2x10  Eccentric pronation/supination 2# 2x10  Therabar yellow 3 way 2x10  Medball  and supinate 2.2# x15  Rows/LAE/Tricep pull downs GTB 2x10  3 way bicep curl 2x10  Medball series 2.2# x15    Manual:   STM to forearm extensors  Assessment:  Added tricep pull downs, ball medball  and supinate, and medball series. Pt reporting muscle soreness and with added exercises. STM performed to forearm extensors to decrease tightness. Verbal cues on correct form with rows. Updated HEP and reviewed.     Plan:  Add weight with bicep curls

## 2024-09-06 ENCOUNTER — LAB (OUTPATIENT)
Dept: LAB | Facility: LAB | Age: 49
End: 2024-09-06
Payer: COMMERCIAL

## 2024-09-06 DIAGNOSIS — I25.10 CORONARY ARTERY DISEASE INVOLVING NATIVE CORONARY ARTERY OF NATIVE HEART WITHOUT ANGINA PECTORIS: ICD-10-CM

## 2024-09-06 DIAGNOSIS — Z12.5 PROSTATE CANCER SCREENING: ICD-10-CM

## 2024-09-06 DIAGNOSIS — E55.9 VITAMIN D DEFICIENCY: ICD-10-CM

## 2024-09-06 DIAGNOSIS — E78.2 MIXED HYPERLIPIDEMIA: ICD-10-CM

## 2024-09-06 LAB
25(OH)D3 SERPL-MCNC: 74 NG/ML (ref 30–100)
ALBUMIN SERPL BCP-MCNC: 4.6 G/DL (ref 3.4–5)
ALP SERPL-CCNC: 74 U/L (ref 33–120)
ALT SERPL W P-5'-P-CCNC: 36 U/L (ref 10–52)
ANION GAP SERPL CALC-SCNC: 11 MMOL/L (ref 10–20)
AST SERPL W P-5'-P-CCNC: 29 U/L (ref 9–39)
BILIRUB SERPL-MCNC: 0.6 MG/DL (ref 0–1.2)
BUN SERPL-MCNC: 18 MG/DL (ref 6–23)
CALCIUM SERPL-MCNC: 10.3 MG/DL (ref 8.6–10.3)
CHLORIDE SERPL-SCNC: 104 MMOL/L (ref 98–107)
CHOLEST SERPL-MCNC: 129 MG/DL (ref 0–199)
CHOLESTEROL/HDL RATIO: 4.3
CO2 SERPL-SCNC: 29 MMOL/L (ref 21–32)
CREAT SERPL-MCNC: 1.1 MG/DL (ref 0.5–1.3)
EGFRCR SERPLBLD CKD-EPI 2021: 82 ML/MIN/1.73M*2
GLUCOSE SERPL-MCNC: 95 MG/DL (ref 74–99)
HDLC SERPL-MCNC: 30 MG/DL
LDLC SERPL CALC-MCNC: 47 MG/DL
NON HDL CHOLESTEROL: 99 MG/DL (ref 0–149)
POTASSIUM SERPL-SCNC: 4.5 MMOL/L (ref 3.5–5.3)
PROT SERPL-MCNC: 7.3 G/DL (ref 6.4–8.2)
PSA SERPL-MCNC: 0.84 NG/ML
SODIUM SERPL-SCNC: 139 MMOL/L (ref 136–145)
TRIGL SERPL-MCNC: 262 MG/DL (ref 0–149)
VLDL: 52 MG/DL (ref 0–40)

## 2024-09-06 PROCEDURE — 84153 ASSAY OF PSA TOTAL: CPT

## 2024-09-06 PROCEDURE — 82306 VITAMIN D 25 HYDROXY: CPT

## 2024-09-06 PROCEDURE — 36415 COLL VENOUS BLD VENIPUNCTURE: CPT

## 2024-09-06 PROCEDURE — 80053 COMPREHEN METABOLIC PANEL: CPT

## 2024-09-06 PROCEDURE — 80061 LIPID PANEL: CPT

## 2024-09-09 ENCOUNTER — TREATMENT (OUTPATIENT)
Dept: PHYSICAL THERAPY | Facility: HOSPITAL | Age: 49
End: 2024-09-09
Payer: COMMERCIAL

## 2024-09-09 ENCOUNTER — APPOINTMENT (OUTPATIENT)
Dept: PRIMARY CARE | Facility: CLINIC | Age: 49
End: 2024-09-09
Payer: COMMERCIAL

## 2024-09-09 VITALS
BODY MASS INDEX: 38.54 KG/M2 | OXYGEN SATURATION: 95 % | HEIGHT: 70 IN | TEMPERATURE: 98.2 F | HEART RATE: 81 BPM | SYSTOLIC BLOOD PRESSURE: 112 MMHG | WEIGHT: 269.2 LBS | DIASTOLIC BLOOD PRESSURE: 76 MMHG

## 2024-09-09 DIAGNOSIS — E78.2 MIXED HYPERLIPIDEMIA: Primary | ICD-10-CM

## 2024-09-09 DIAGNOSIS — E66.01 CLASS 3 SEVERE OBESITY WITH SERIOUS COMORBIDITY AND BODY MASS INDEX (BMI) OF 40.0 TO 44.9 IN ADULT, UNSPECIFIED OBESITY TYPE: ICD-10-CM

## 2024-09-09 DIAGNOSIS — I25.10 CORONARY ARTERY DISEASE INVOLVING NATIVE CORONARY ARTERY OF NATIVE HEART WITHOUT ANGINA PECTORIS: ICD-10-CM

## 2024-09-09 DIAGNOSIS — E55.9 VITAMIN D DEFICIENCY: ICD-10-CM

## 2024-09-09 DIAGNOSIS — K21.9 GASTROESOPHAGEAL REFLUX DISEASE WITHOUT ESOPHAGITIS: ICD-10-CM

## 2024-09-09 DIAGNOSIS — S46.211A BICEPS RUPTURE, DISTAL, RIGHT, INITIAL ENCOUNTER: Primary | ICD-10-CM

## 2024-09-09 DIAGNOSIS — F32.5 MAJOR DEPRESSIVE DISORDER WITH SINGLE EPISODE, IN FULL REMISSION (CMS-HCC): ICD-10-CM

## 2024-09-09 DIAGNOSIS — N52.9 ERECTILE DYSFUNCTION, UNSPECIFIED ERECTILE DYSFUNCTION TYPE: ICD-10-CM

## 2024-09-09 DIAGNOSIS — F41.9 ANXIETY: ICD-10-CM

## 2024-09-09 PROBLEM — R79.89 LOW VITAMIN D LEVEL: Status: ACTIVE | Noted: 2024-09-09

## 2024-09-09 PROBLEM — E29.1 TESTICULAR HYPOFUNCTION: Status: ACTIVE | Noted: 2018-02-10

## 2024-09-09 PROCEDURE — 3008F BODY MASS INDEX DOCD: CPT | Performed by: FAMILY MEDICINE

## 2024-09-09 PROCEDURE — 97110 THERAPEUTIC EXERCISES: CPT | Mod: GP,CQ

## 2024-09-09 PROCEDURE — 99214 OFFICE O/P EST MOD 30 MIN: CPT | Performed by: FAMILY MEDICINE

## 2024-09-09 ASSESSMENT — PAIN SCALES - GENERAL: PAINLEVEL_OUTOF10: 0 - NO PAIN

## 2024-09-09 ASSESSMENT — PAIN - FUNCTIONAL ASSESSMENT: PAIN_FUNCTIONAL_ASSESSMENT: 0-10

## 2024-09-09 NOTE — PATIENT INSTRUCTIONS
Follow up in 3 months.    It was a pleasure to see you today. Thank you for choosing us for your health care needs.    If you have lab or other testing completed and have not been informed of results within one week, please call the office for your results.    If you haven't done so, consider signing up for [facility], the [facility] personal health record. Ask the staff how you can get started.

## 2024-09-09 NOTE — PROGRESS NOTES
Subjective   Patient ID: Orlando Washington is a 49 y.o. male who presents for Follow-up.    HPI     Pt would to go over lab results.    Pt would like to discuss if we can take over with filling his buspar and zoloft for the future. States he doesn't need any refills at this time. He is no longer seeing his provider as she is leaving her practice.    He had right distal biceps tendon repair on 7/15/2024 with Dr. Garza. He has been doing physical therapy.    Flu vaccine declined.    No concerns at this time.     Labs: 09/06/2024  PSA: 9/2024  Colonoscopy: 1/2021      Patient has hyperlipidemia.  He does try to limit the amount of fatty foods and high cholesterol foods that are consumed.  Pt has been compliant with rosuvastatin and denies any noted side effects.     He has CAD.  Pt underwent PCI with stenting in the past.  He follows with cardiology on a regular basis.  He denies any chest pain, or exercise intolerance.     Pt has vitamin D deficiency.  He is compliant with his vitamin D supplement.      He has anxiety.  Patient reports that his anxiety has been stable on the current medications.     Pt has GERD.   Patient states that PPIs working well to control symptoms and denies any breakthrough heartburn.      Pt has ALHAJI.  He has been trying to utilize it nightly.  He is still unable to use his CPAP all night due to taking it off (during the night) regardless of what mask he puts on.     Has known fatty liver Dz with elevated LFTs.  Was evaluated by hepatology.    Review of Systems  Constitutional: Patient denies any fever, chills, loss of appetite, or unexplained weight loss.  Cardiovascular: Patient denies any chest pain, shortness of breath with exertion, tachycardia, palpitations, orthopnea, or paroxysmal nocturnal dyspnea.  Respiratory: Patient denies any cough, shortness breath, or wheezing.  Gastrointestinal: Patient denies any nausea, vomiting, diarrhea, constipation, melena, hematochezia, or reflux  "symptoms  Skin: Denies any rashes or skin lesions  Neurology: Patient denies any new motor or sensory losses. Denies any numbness, tingling, weakness, and incoordination of the extremities. Patient also denies any tremor, seizures, or gait instability.  Endocrinology: Denies any polyuria, polydipsia, polyphagia, or heat/cold intolerance.  Psychiatric: Patient denies any depression, or suicidal/homicidal ideation. Anxiety symptoms have been stable with the current medication.    Objective   /76   Pulse 81   Temp 36.8 °C (98.2 °F)   Ht 1.778 m (5' 10\")   Wt 122 kg (269 lb 3.2 oz)   SpO2 95%   BMI 38.63 kg/m²     Physical Exam  General Appearance: Alert and cooperative, in no acute distress, well-developed/well-nourished, obese male.     Neck: Supple and without adenopathy or rigidity. There is no JVD at 90° and no carotid bruits are noted. There is no thyromegaly, thyroid tenderness, or palpable thyroid nodules.  Heart: Regular rate and rhythm without murmur or ectopy.  Lungs: Clear to auscultation bilaterally with good air exchange.  Skin: Good turgor, moist, warm and without rashes or lesions.  Neurological exam: Alert and oriented ×3, no tremor, normal gait.  Extremities: No clubbing, cyanosis, or edema  Psychiatric: Appropriate mood and affect, good insight and judgment, no delusions or thought disorders, no suicidal or homicidal ideation    Assessment/Plan   Hyperlipidemia:   Stable based on last labs.  Patient will continue with the current medication.  Dietary changes, exercise, and maintenance of healthy weight were discussed at length.    Coronary artery disease:   Patient is without worrisome signs or symptoms for unstable angina.  Risk factor modification was discussed at length.  Dietary changes, exercise and maintenance of a healthy weight were discussed.    Vitamin D deficiency:  Stable based on most recent labs.  We will continue the current dose of vitamin D supplementation.    Erectile " dysfunction:  Stable based on symptoms.  Continue the sildenafil PRN.    Anxiety/depression:  Stable based on symptoms.  We will continue the current medications.    Obesity: Dietary changes, exercise, and maintenance of a healthy weight were discussed at length.  Goal is to achieve a BMI less than 25.    GERD:  Stable based on symptoms.  Appropriate dietary changes encouraged.  Continue the current treatment plan.    Follow up in 3 months.      Scribe Attestation  By signing my name below, I, Lubna Schultz   attest that this documentation has been prepared under the direction and in the presence of Kulwant Maxwell DO.          CONTINUE THE CURRENT MEDICATIONS WITHOUT CHANGE:  Current Outpatient Medications   Medication Instructions    aspirin 81 mg, oral, Daily    atenolol (TENORMIN) 25 mg, oral, Daily    busPIRone (BUSPAR) 15 mg, oral, 2 times daily    cholecalciferol (VITAMIN D-3) 50 mcg, oral, Daily    esomeprazole (NEXIUM) 20 mg, oral, Daily before breakfast    hydrocortisone-pramoxine (Proctofoam HC) rectal foam 1 applicator, rectal, 2 times daily    icosapent ethyL (VASCEPA) 1 g, oral, 2 times daily (morning and late afternoon)    psyllium seed, with sugar, (FIBER SUPPLEMENT ORAL) once a day    rosuvastatin (CRESTOR) 20 mg, oral, Daily    sertraline (ZOLOFT) 100 mg, oral, Daily    sildenafil (VIAGRA) 100 mg, oral, Daily PRN     Results for orders placed or performed in visit on 09/06/24   Prostate Specific Antigen, Screen   Result Value Ref Range    Prostate Specific Antigen,Screen 0.84 <=4.00 ng/mL   Lipid Panel   Result Value Ref Range    Cholesterol 129 0 - 199 mg/dL    HDL-Cholesterol 30.0 mg/dL    Cholesterol/HDL Ratio 4.3     LDL Calculated 47 <=99 mg/dL    VLDL 52 (H) 0 - 40 mg/dL    Triglycerides 262 (H) 0 - 149 mg/dL    Non HDL Cholesterol 99 0 - 149 mg/dL   Comprehensive Metabolic Panel   Result Value Ref Range    Glucose 95 74 - 99 mg/dL    Sodium 139 136 - 145 mmol/L    Potassium 4.5 3.5 -  5.3 mmol/L    Chloride 104 98 - 107 mmol/L    Bicarbonate 29 21 - 32 mmol/L    Anion Gap 11 10 - 20 mmol/L    Urea Nitrogen 18 6 - 23 mg/dL    Creatinine 1.10 0.50 - 1.30 mg/dL    eGFR 82 >60 mL/min/1.73m*2    Calcium 10.3 8.6 - 10.3 mg/dL    Albumin 4.6 3.4 - 5.0 g/dL    Alkaline Phosphatase 74 33 - 120 U/L    Total Protein 7.3 6.4 - 8.2 g/dL    AST 29 9 - 39 U/L    Bilirubin, Total 0.6 0.0 - 1.2 mg/dL    ALT 36 10 - 52 U/L   Vitamin D 25-Hydroxy,Total (for eval of Vitamin D levels)   Result Value Ref Range    Vitamin D, 25-Hydroxy, Total 74 30 - 100 ng/mL

## 2024-09-09 NOTE — PROGRESS NOTES
"Physical Therapy Treatment    Patient Name: Orlando Washington  MRN: [id number]  Today's Date: 9/9/2024     PT Therapeutic Procedures Time Entry  Therapeutic Exercise Time Entry: 38                   Current Problem  1. Biceps rupture, distal, right, initial encounter            Insurance   [employer] PT OT COPAY 0 DED 0   COVERAGE 100 OOP 4500(0) 9500(0) NO AUTH REQ   REF# JOSIAS W [id number]/ALL      Visit: 7/10    Precautions  Precautions  Precautions Comment: 8/27: discontinue the brace work on ROM and may begin strengthening      Subjective       Pain  Pain Assessment: 0-10  0-10 (Numeric) Pain Score: 0 - No pain      Objective     Treatments:  UBE no resistance F/R 3'/3'  Wrist flex/ext 3# 2x10  Eccentric pronation/supination 3# 2x10  Therabar yellow 3 way 2x10  Medball  and supinate 2.2# 2x10  Rows/LAE/Tricep pull downs GTB 3x10  3 way bicep curl 2x10 2#  Medball series 2.2# x15   Standing I,Y,T 2# 2x10  Body body flex/AB 10\"x3  B ER RTB 2x10  Assessment:  Progressed wt with most exercises and added standing I,Y,T's, body blade, and B ER. Verbal cues on correct form with tricep pull downs displaying good carryover. Pt continued to deny pain after treatment. Updated HEP and reviewed.     Plan:  Cont to progress strength      "

## 2024-09-11 ENCOUNTER — TREATMENT (OUTPATIENT)
Dept: PHYSICAL THERAPY | Facility: HOSPITAL | Age: 49
End: 2024-09-11
Payer: COMMERCIAL

## 2024-09-11 DIAGNOSIS — S46.211S BICEPS RUPTURE, DISTAL, RIGHT, SEQUELA: Primary | ICD-10-CM

## 2024-09-11 PROCEDURE — 97110 THERAPEUTIC EXERCISES: CPT | Mod: GP

## 2024-09-11 ASSESSMENT — PAIN - FUNCTIONAL ASSESSMENT: PAIN_FUNCTIONAL_ASSESSMENT: 0-10

## 2024-09-11 ASSESSMENT — PAIN SCALES - GENERAL: PAINLEVEL_OUTOF10: 0 - NO PAIN

## 2024-09-11 NOTE — PROGRESS NOTES
Physical Therapy    Physical Therapy Treatment    Patient Name: Orlando Washington  MRN: [id number]  Today's Date: 9/11/2024    Time Entry:   Time Calculation  Start Time: 1101  Stop Time: 1142  Time Calculation (min): 41 min     PT Therapeutic Procedures Time Entry  Therapeutic Exercise Time Entry: 41                 Insurance   [employer] PT OT COPAY 0 DED 0   COVERAGE 100 OOP 4500(0) 9500(0) NO AUTH REQ   REF# JOSIAS W [id number]/ALL      Visit: 8/10     Precautions  Precautions  Precautions Comment: 8/27: discontinue the brace work on ROM and may begin strengthening    Assessment:  PT Assessment  PT Assessment Results: Decreased strength, Decreased endurance, Orthopedic restrictions  Rehab Prognosis: Good  Evaluation/Treatment Tolerance: Patient tolerated treatment well  Patient has attended 8 PT visits s/p distal bicep repair. He is currently progressing toward all of his goals. He is demonstrating improving elbow ROM and strength in R UE. Currently he remains limited with resistance per protocol. He will continue to benefit from skilled PT to improve R UE strength to improve functional use to allow him to return to PLOF.   Plan:  OP PT Plan  Treatment/Interventions: Dry needling, Education/ Instruction, Manual therapy, Neuromuscular re-education, Self care/ home management, Taping techniques, Therapeutic activities, Therapeutic exercises  PT Plan: Skilled PT  PT Frequency: 2 times per week  Duration: 8 visits  Rehab Potential: Good    Current Problem  1. Biceps rupture, distal, right, sequela            General   Follow up with surgeon 10/8/24. Patient hopeful to RTW of 10/15/24.  Patient notes 50% overall improvement since surgery.    Needs to be able to lift #100 to be able to RTW       Subjective    Precautions  Precautions  Precautions Comment: 8/27: discontinue the brace work on ROM and may begin strengthening       Pain  Pain Assessment  Pain Assessment: 0-10  0-10 (Numeric) Pain Score: 0 -  No pain    Objective      AROM    Right shoulder flexion: WFL       Right shoulder abduction: WFL       Right shoulder extension: WFL    Right shoulder ER: WFL       Right shoulder IR: WFL     Right HBB: NT      Left HBB: NT                Right wrist flexion:              Right wrist extension:     PROM   R elbow flexion: WFL  R elbow extension: 0  R elbow pronation: WFL  R elbow supination: WFL           MMT    Deltoid flexion right:  5/5    Deltoid abduction right:  5/5    ER @ 0 degrees abduction right: 5/5    IR @ 0 degrees abduction right: 5/5  Bicep right: 4-/5  Tricep right: 4+/5  Pronation right: 4-/5  Supination right: 4+/5  Wrist flexion right: 4-/5  Wrist extension right: 5/5  : 100#    Outcome Measures:  Other Measures  Disability of Arm Shoulder Hand (DASH): 27 (36.36%-Quick DASH)    Treatments:  UBE no resistance F/R 3'/3'  Wrist flex/ext 3# 3x10  Ulnar and radial deviation: 3# 2x10  Eccentric pronation/supination blue therabar 2x10  Therabar yellow 4 way 2x10  3 way bicep curl 2x10 2#    Medball  and supinate 2.2# ---  Rows/LAE/Tricep pull downs GTB ---  Medball series 2.2# ---   Standing I,Y,T 2# ---  Body body flex/AB ---  B ER RTB ---    OP EDUCATION:       Goals:  1. Patient will be independent with HEP (PROGRESSING)  2. Patient will demonstrate R elbow AROM WNL  (PROGRESSING)  3. Patient will demonstrate R elbow strength >/=4/5 to be able to lift and carry  (PROGRESSING)  4. Patient will demonstrate >/=50% improvement in Quick DASH demonstrating improved functional mobility  (PROGRESSING)

## 2024-09-26 ENCOUNTER — TREATMENT (OUTPATIENT)
Dept: PHYSICAL THERAPY | Facility: HOSPITAL | Age: 49
End: 2024-09-26
Payer: COMMERCIAL

## 2024-09-26 DIAGNOSIS — E78.2 MIXED HYPERLIPIDEMIA: ICD-10-CM

## 2024-09-26 DIAGNOSIS — K21.9 GASTROESOPHAGEAL REFLUX DISEASE WITHOUT ESOPHAGITIS: ICD-10-CM

## 2024-09-26 DIAGNOSIS — S46.211S BICEPS RUPTURE, DISTAL, RIGHT, SEQUELA: Primary | ICD-10-CM

## 2024-09-26 DIAGNOSIS — E78.1 HYPERTRIGLYCERIDEMIA: ICD-10-CM

## 2024-09-26 DIAGNOSIS — I25.10 CORONARY ARTERY DISEASE INVOLVING NATIVE CORONARY ARTERY OF NATIVE HEART WITHOUT ANGINA PECTORIS: ICD-10-CM

## 2024-09-26 PROCEDURE — 97110 THERAPEUTIC EXERCISES: CPT | Mod: GP

## 2024-09-26 RX ORDER — ROSUVASTATIN CALCIUM 20 MG/1
20 TABLET, COATED ORAL DAILY
Qty: 90 TABLET | Refills: 0 | Status: SHIPPED | OUTPATIENT
Start: 2024-09-26

## 2024-09-26 RX ORDER — HYDROGEN PEROXIDE 3 %
20 SOLUTION, NON-ORAL MISCELLANEOUS
Qty: 90 CAPSULE | Refills: 0 | Status: SHIPPED | OUTPATIENT
Start: 2024-09-26 | End: 2024-12-25

## 2024-09-26 RX ORDER — NAPROXEN SODIUM 220 MG/1
81 TABLET, FILM COATED ORAL DAILY
Qty: 90 TABLET | Refills: 0 | Status: SHIPPED | OUTPATIENT
Start: 2024-09-26

## 2024-09-26 RX ORDER — ICOSAPENT ETHYL 1 G/1
1 CAPSULE ORAL
Qty: 180 CAPSULE | Refills: 0 | Status: SHIPPED | OUTPATIENT
Start: 2024-09-26

## 2024-09-26 ASSESSMENT — PAIN - FUNCTIONAL ASSESSMENT: PAIN_FUNCTIONAL_ASSESSMENT: 0-10

## 2024-09-26 ASSESSMENT — PAIN SCALES - GENERAL: PAINLEVEL_OUTOF10: 0 - NO PAIN

## 2024-09-26 NOTE — PROGRESS NOTES
PT DAILY TREATMENT NOTE 10-18    Patient Name: Mya Duarte  Date:3/11/2020  : 1962  [x]  Patient  Verified  Payor: BLUE CROSS / Plan: [facility] PPO / Product Type: PPO /    In time: 8:30  Out time:10:00  Total Treatment Time (min): 90  Visit #: 6 of 10    Medicare/BCBS Only   Total Timed Codes (min):  80 1:1 Treatment Time:  60       Treatment Area: Pain in left knee [M25.562]    SUBJECTIVE  Pain Level (0-10 scale): 4/10  Any medication changes, allergies to medications, adverse drug reactions, diagnosis change, or new procedure performed?: [x] No    [] Yes (see summary sheet for update)  Subjective functional status/changes:   [] No changes reported  \"Doing okay. \"    OBJECTIVE    Modality rationale: decrease edema, decrease inflammation and decrease pain to improve the patients ability to perform ADLs with less difficulty.    Min Type Additional Details    [] Estim:  []Unatt       []IFC  []Premod                        []Other:  []w/ice   []w/heat  Position:  Location:    [] Estim: []Att    []TENS instruct  []NMES                    []Other:  []w/US   []w/ice   []w/heat  Position:  Location:    []  Traction: [] Cervical       []Lumbar                       [] Prone          []Supine                       []Intermittent   []Continuous Lbs:  [] before manual  [] after manual    []  Ultrasound: []Continuous   [] Pulsed                           []1MHz   []3MHz W/cm2:  Location:    []  Iontophoresis with dexamethasone         Location: [] Take home patch   [] In clinic   10 [x]  Ice     []  heat  []  Ice massage  []  Laser   []  Anodyne Position: reclined with wedge under LE  Location:left knee    []  Laser with stim  []  Other:  Position:  Location:    []  Vasopneumatic Device Pressure:       [] lo [] med [] hi   Temperature: [] lo [] med [] hi   [x] Skin assessment post-treatment:  [x]intact [x]redness- no adverse reaction    []redness - adverse reaction:   68 min Therapeutic Exercise: Physical Therapy    Physical Therapy Treatment    Patient Name: Orlando Washington  MRN: [id number]  Today's Date: 9/26/2024    Time Entry:   Time Calculation  Start Time: 0705  Stop Time: 0744  Time Calculation (min): 39 min     PT Therapeutic Procedures Time Entry  Therapeutic Exercise Time Entry: 39                 Insurance   [employer] BM[employer] PT OT COPAY 0 DED 0   COVERAGE 100 OOP 4500(0) 9500(0) NO AUTH REQ   REF# JOSIAS PENN [id number]/ALL      Visit: 9/16    Assessment:   Introduced several new exercises to challenge patient. Muscular fatigue observed as expected. No c/o increased pain. Patient noted a clicking in L elbow, but denies pain. Will continue to progress as appropriate.   Plan:   Continue to progress strength as able    Current Problem  1. Biceps rupture, distal, right, sequela            General   No new complaints       Subjective    Precautions  Precautions  Precautions Comment: 8/27: discontinue the brace work on ROM and may begin strengthening     Pain  Pain Assessment  Pain Assessment: 0-10  0-10 (Numeric) Pain Score: 0 - No pain    Objective     Treatments:  UBE no resistance F/R 3'/3'  Rows/LAE/Tricep pull downs GTB 2x15 each   ER/IR: GTB 2x15 each   3 way bicep curl: 3# 2x15  Medball series 2.2# 2x15  Waist <> mid shoulder: 6kg x15  Waist <> shoulder: 6kg x15  Wrist flex/ext 4# 3x10  Ulnar and radial deviation: 4# 2x10  Eccentric pronation/supination 2# 2x10 (mid way hand hold)       Therabar yellow 4 way ---  Medball  and supinate 2.2# ---  Standing I,Y,T 2# ---  Body body flex/AB ---  B ER RTB ---         [] See flow sheet :   Rationale: increase ROM and increase strength to improve the patients ability to perform ADLs with less difficulty. 12 min Therapeutic Activity:  []  See flow sheet :   Rationale: increase ROM and increase strength  to improve the patients ability to perform work duties with less difficulty. With   [] TE   [] TA   [] neuro   [] other: Patient Education: [x] Review HEP    [] Progressed/Changed HEP based on:   [] positioning   [] body mechanics   [] transfers   [] heat/ice application    [] other:      Other Objective/Functional Measures: Pt was able to ambulate for 1/4 mile with minimal left knee discomfort. Pain Level (0-10 scale) post treatment: 3-4/10    ASSESSMENT/Changes in Function: Initiated TM per flow sheet. Pt was able to perform 1/4 mile at 1.3 mph with minimal discomfort. Pt reported a slight decrease in pain after therapy today. Patient will continue to benefit from skilled PT services to modify and progress therapeutic interventions, address functional mobility deficits, address ROM deficits, address strength deficits, analyze and address soft tissue restrictions, analyze and cue movement patterns, analyze and modify body mechanics/ergonomics, assess and modify postural abnormalities and address imbalance/dizziness to attain remaining goals. []  See Plan of Care  []  See progress note/recertification  []  See Discharge Summary         Progress towards goals / Updated goals:  Short Term Goals: To be accomplished in 5 treatments:  1. Pt will be compliant and independent with HEP in order to facilitate PT sessions and aid with self management             Eval Status:  Initiated             Current Status:MET. Pt. Reports compliance. 3/2/20  2. Pt to tolerate 30 min or more of TE and/or Interventions w/o increased s/s             Eval Status:  Initiated             Current Status:Progressing. Pt reported pain with heel slides only.  3/4/20     Long Term Goals: To be accomplished in 10 treatments:  1. Pt will report 50% improvement or better with left knee involvement to show a significant increase in function for carryover to normal walking and usual daily activity with little difficulty             Eval Status:  Initiated             Current Status:MET. Pt reports 75% improvement since [facility]. 3/9/20  2. Pt will demonstrate the ability to negotiated 20 6\" steps with little to no HHA or difficulty for carryover to negotiating stairs at home with a normal reciprocal gait pattern               Eval Status:  Periodic step to and reciprocal gait pattern             Current Status:Progressing. Pt is able to perform 20 6\" steps with one HHA. 3/9/20  3. Pt will ambulate on TM for 1/2 mile with little to no difficulty for carryover to normal (I) community ambulation with no difficulty               Eval Status:  Require use of SPC with community ambulation             Current Status:Progressing. Pt was able to ambulate for 1/4 mile with minimal left knee discomfort. 3/11/20  4. Pt will have decreased girth of the left knee by 2 cm or more to allow decreased stiffness and improved mobility to allow performance of normal daily activity without the worry of stiffness limited activity. Eval Status:  Left knee girth at middle of patella, 51.3 cm             Current Status:MET. Left knee girth 47 cm. 3/4/20  5. Pt will improve FOTO score to 57 in 15 visits to show significant improvement in function for progress to independent community ambulation.              Eval Status: 45             Current Status:     PLAN  [x]  Upgrade activities as tolerated     [x]  Continue plan of care  []  Update interventions per flow sheet       []  Discharge due to:_  []  Other:_      Shameka Jon PTA 3/11/2020  8:08 AM    Future Appointments   Date Time Provider Rachel Bauer   3/13/2020  9:30 AM Colby Abarca PTA [facility] [address]

## 2024-09-30 ENCOUNTER — TREATMENT (OUTPATIENT)
Dept: PHYSICAL THERAPY | Facility: HOSPITAL | Age: 49
End: 2024-09-30
Payer: COMMERCIAL

## 2024-09-30 DIAGNOSIS — S46.211S BICEPS RUPTURE, DISTAL, RIGHT, SEQUELA: Primary | ICD-10-CM

## 2024-09-30 PROCEDURE — 97110 THERAPEUTIC EXERCISES: CPT | Mod: GP

## 2024-09-30 ASSESSMENT — PAIN SCALES - GENERAL: PAINLEVEL_OUTOF10: 0 - NO PAIN

## 2024-09-30 ASSESSMENT — PAIN - FUNCTIONAL ASSESSMENT: PAIN_FUNCTIONAL_ASSESSMENT: 0-10

## 2024-09-30 NOTE — PROGRESS NOTES
Physical Therapy    Physical Therapy Treatment    Patient Name: Orlando Washington  MRN: [id number]  Today's Date: 9/30/2024    Time Entry:   Time Calculation  Start Time: 1447  Stop Time: 1525  Time Calculation (min): 38 min     PT Therapeutic Procedures Time Entry  Therapeutic Exercise Time Entry: 38                 Insurance   [employer] BM[employer] PT OT COPAY 0 DED 0   COVERAGE 100 OOP 4500(0) 9500(0) NO AUTH REQ   REF# JOSIAS W [id number]/ALL      Visit: 10/16    Assessment:   Increased resistance with theraband exercises this date. Also increased free weight resistance as able with most exercises this date. No c/o pain with exercises. Muscular fatigue observed as expected.   Plan:   Continue to progress strength as able    Current Problem  1. Biceps rupture, distal, right, sequela            General   No new complaints        Subjective    Precautions  Precautions  Precautions Comment: 8/27: discontinue the brace work on ROM and may begin strengthening       Pain  Pain Assessment  Pain Assessment: 0-10  0-10 (Numeric) Pain Score: 0 - No pain    Objective     Treatments:  UBE no resistance F/R 3'/3'  Rows/LAE/Tricep pull downs BTB 2x10 each   ER/IR: BTB 2x10 each   Medball series 2.2# (with overhead)  2x15  3 way bicep curl: 4# 2x10  Waist <> mid shoulder: 10kg 2x15  Waist <> shoulder: 10kg 2x15  Wrist flex/ext 4# 3x10  Ulnar and radial deviation: 4# 3x10  Eccentric pronation/supination 2# 2x10 (mid way hand hold)   Therabar 4 way red: 20x each   Standing I,Y,T 2# 20x each         Medball  and supinate 2.2# ---    Body body flex/AB ---  B ER RTB ---

## 2024-10-02 ENCOUNTER — TREATMENT (OUTPATIENT)
Dept: PHYSICAL THERAPY | Facility: HOSPITAL | Age: 49
End: 2024-10-02
Payer: COMMERCIAL

## 2024-10-02 DIAGNOSIS — S46.211S BICEPS RUPTURE, DISTAL, RIGHT, SEQUELA: Primary | ICD-10-CM

## 2024-10-02 ASSESSMENT — PAIN - FUNCTIONAL ASSESSMENT: PAIN_FUNCTIONAL_ASSESSMENT: 0-10

## 2024-10-02 ASSESSMENT — PAIN SCALES - GENERAL: PAINLEVEL_OUTOF10: 0 - NO PAIN

## 2024-10-02 NOTE — PROGRESS NOTES
Physical Therapy Treatment    Patient Name: Orlando Washington  MRN: [id number]  Today's Date: 10/2/2024                         Current Problem  1. Biceps rupture, distal, right, sequela            Insurance   [employer] BM[employer] PT OT COPAY 0 DED 0   COVERAGE 100 OOP 4500(0) 9500(0) NO AUTH REQ   REF# JOSIAS W [id number]/ALL      Visit: 11/16    Precautions  Precautions  Precautions Comment: 8/27: discontinue the brace work on ROM and may begin strengthening      Subjective   Pt stating he follows up with Dr. Garza next week and then thinks he will be released back to work.     Pain  Pain Assessment: 0-10  0-10 (Numeric) Pain Score: 0 - No pain      Objective     Treatments:  UBE L5 F/R 3'/3'  Rows/LAE/Tricep pull downs BTB 2x15 each   ER/IR: BTB 2x15 each   Medball series 3.3# (with overhead)  2x10  3 way bicep curl: 4# 2x10  Waist <> mid shoulder: 7 kg 2x15  Waist <> shoulder: 7 kg 2x15  Therabar 4 way green : 20x each   I,Y,T on PB 3# 2x10  Wall push ups 2x10   Assessment:  Pt stating he feels like he is good with the exercises and would like today to be his last visit. Reviewed HEP and increased reps and wt without any difficulty. No formal measurements taken today.     Plan:   30 day hold for follow up with surgeon per patient request d/t anticipation for RTW

## 2024-10-07 ENCOUNTER — APPOINTMENT (OUTPATIENT)
Dept: PHYSICAL THERAPY | Facility: HOSPITAL | Age: 49
End: 2024-10-07
Payer: COMMERCIAL

## 2024-10-07 NOTE — PROGRESS NOTES
"          History of Present Illness   Follow-up right distal biceps rupture from 7/15/2024.  He is doing very well in his recovery.  He has minimal pain.  He notes he has \"tested the scales\" recently and it let him know when he did too much.     Review of Systems   GENERAL: Negative for malaise, significant weight loss, fever  MUSCULOSKELETAL: see HPI  NEURO:  Negative     Past Medical History:   Diagnosis Date    Personal history of other diseases of the circulatory system 01/14/2020    History of angina pectoris    Personal history of other specified conditions 01/14/2020    History of palpitations        Medication Documentation Review Audit       Reviewed by Kulwant Maxwell DO (Physician) on 09/09/24 at 1733      Medication Order Taking? Sig Documenting Provider Last Dose Status   aspirin 81 mg chewable tablet [id number]  Chew 1 tablet (81 mg) once daily. Kulwant Maxwell DO  Active   atenolol (Tenormin) 25 mg tablet [id number] No TAKE 1 TABLET BY MOUTH ONCE DAILY Armin Nava MD Taking Active   busPIRone (Buspar) 15 mg tablet [id number] No Take 1 tablet (15 mg) by mouth 2 times a day. Nery Paniagua PA-C Taking Active   cholecalciferol (Vitamin D-3) 50 mcg (2,000 unit) capsule [id number] No Take 1 capsule (50 mcg) by mouth early in the morning.. Historical Provider, MD Taking Active   esomeprazole (NexIUM) 20 mg DR capsule [id number]  Take 1 capsule (20 mg) by mouth once daily in the morning. Take before meals. Kulwant Maxwell DO  Active   hydrocortisone-pramoxine (Proctofoam HC) rectal foam [id number] No Insert 1 applicator into the rectum 2 times a day. Kulwant Maxwell DO Taking Active   icosapent ethyL (Vascepa) 1 gram capsule [id number] No Take 1 capsule (1 g) by mouth 2 times a day with meals. Armin Nava MD Taking Active   psyllium seed, with sugar, (FIBER SUPPLEMENT ORAL) [id number] No once a day Historical Provider, MD Taking Active   rosuvastatin (Crestor) 20 mg tablet [id number]  Take 1 tablet (20 mg) " by mouth once daily. Kulwant Maxwell, DO  Active   sertraline (Zoloft) 25 mg tablet [id number] No Take 4 tablets (100 mg) by mouth once daily. Kulwant Maxwell DO Taking Active   sildenafil (Viagra) 100 mg tablet [id number] No Take 1 tablet (100 mg) by mouth once daily as needed for erectile dysfunction. Kulwant Maxwell DO Taking  24 [id number]                     Physical Exam  Right upper extremity  Incision is well-healed without any evidence of erythema ecchymosis or effusion  No tenderness to palpation  The repair is intact  He has full range of motion at the right elbow  He is distally neurovascularly intact     Imaging  [place]ascular [place] duplex right              [facility] [place]  [address] [place] [address]      Tel [phone] Fax [phone]       Vascular Lab Report     VASC US UPPER EXTREMITY VENOUS DUPLEX RIGHT    Patient Name:      MADDY Warner Physician:  [id number] Frances Man MD  Study Date:        2024            Ordering Provider:  [id number] MCNULTY  MRN/PID:           [id number]             Fellow:  Accession#:        [id number]         Technologist:       Herbie SOLORZANO RVT  Date of Birth/Age: 1975 / 49 years Technologist 2:  Gender:            M                    Encounter#:         [id number]  Admission Status:  Emergency            Location Performed: [facility]       Diagnosis/ICD: Right arm swelling-M79.89; Pain in right arm-M79.601  Indication:    Limb edema  CPT Codes:     25609 Peripheral venous duplex scan for DVT Limited       Pertinent History: CAD, Hyperlipidemia and Recent Surgery.       CONCLUSIONS:  Right Upper Venous: No evidence of acute deep vein thrombus visualized in the right upper extremity. Additional Findings; Cystic Structure. There is evidence of a right forearm  hematoma near recent surgery incision ~ 2.6cm x 1.7cm x .6cm.  Left Upper Venous: The subclavian vein demonstrates a normal spontaneous and phasic flow.     Imaging & Doppler Findings:     Right               Compressible Thrombus        Flow  Internal Jugular        Yes        None   Spontaneous/Phasic  Subclavian Proximal     Yes        None   Spontaneous/Phasic  Subclavian Mid          Yes        None  Subclavian Distal       Yes        None   Spontaneous/Phasic  Axillary                Yes        None   Spontaneous/Phasic  Brachial                Yes        None  Cephalic                Yes        None  Basilic                 Yes        None       Left                       Flow  Subclavian Proximal Spontaneous/Phasic       [id number] Frances Man MD  Electronically signed by [id number] Frances Man MD on 7/30/2024 at 3:23:54 PM       ** Final **       Assessment   Status post right distal biceps repair     Plan  Return to work 10/14/2024, full duty  Follow-up as needed in the future  All questions answered

## 2024-10-08 ENCOUNTER — APPOINTMENT (OUTPATIENT)
Dept: ORTHOPEDIC SURGERY | Facility: CLINIC | Age: 49
End: 2024-10-08
Payer: COMMERCIAL

## 2024-10-08 DIAGNOSIS — Z09 FOLLOW-UP SURGERY CARE: Primary | ICD-10-CM

## 2024-10-08 PROCEDURE — 99024 POSTOP FOLLOW-UP VISIT: CPT | Performed by: ORTHOPAEDIC SURGERY

## 2024-10-08 PROCEDURE — 1036F TOBACCO NON-USER: CPT | Performed by: ORTHOPAEDIC SURGERY

## 2024-10-08 NOTE — LETTER
October 8, 2024     Patient: Orlando Washington   YOB: 1975   Date of Visit: 10/8/2024       To Whom It May Concern:    It is my medical opinion that Orlando Washington may return to work on 10-14-24 full duty, no restrictions .    If you have any questions or concerns, please don't hesitate to call.         Sincerely,      Haresh Garza MD    CC: No Recipients

## 2024-10-09 ENCOUNTER — APPOINTMENT (OUTPATIENT)
Dept: PHYSICAL THERAPY | Facility: HOSPITAL | Age: 49
End: 2024-10-09
Payer: COMMERCIAL

## 2024-10-14 ENCOUNTER — APPOINTMENT (OUTPATIENT)
Dept: PHYSICAL THERAPY | Facility: HOSPITAL | Age: 49
End: 2024-10-14
Payer: COMMERCIAL

## 2024-10-16 ENCOUNTER — APPOINTMENT (OUTPATIENT)
Dept: PHYSICAL THERAPY | Facility: HOSPITAL | Age: 49
End: 2024-10-16
Payer: COMMERCIAL

## 2024-10-21 ENCOUNTER — APPOINTMENT (OUTPATIENT)
Dept: PHYSICAL THERAPY | Facility: HOSPITAL | Age: 49
End: 2024-10-21
Payer: COMMERCIAL

## 2024-10-23 ENCOUNTER — APPOINTMENT (OUTPATIENT)
Dept: PHYSICAL THERAPY | Facility: HOSPITAL | Age: 49
End: 2024-10-23
Payer: COMMERCIAL

## 2024-12-16 ENCOUNTER — APPOINTMENT (OUTPATIENT)
Dept: PRIMARY CARE | Facility: CLINIC | Age: 49
End: 2024-12-16
Payer: COMMERCIAL

## 2024-12-16 VITALS
WEIGHT: 272.8 LBS | OXYGEN SATURATION: 97 % | BODY MASS INDEX: 39.05 KG/M2 | HEART RATE: 69 BPM | TEMPERATURE: 98.8 F | DIASTOLIC BLOOD PRESSURE: 85 MMHG | HEIGHT: 70 IN | SYSTOLIC BLOOD PRESSURE: 128 MMHG

## 2024-12-16 DIAGNOSIS — E55.9 VITAMIN D DEFICIENCY: ICD-10-CM

## 2024-12-16 DIAGNOSIS — I25.10 CORONARY ARTERY DISEASE INVOLVING NATIVE CORONARY ARTERY OF NATIVE HEART WITHOUT ANGINA PECTORIS: ICD-10-CM

## 2024-12-16 DIAGNOSIS — F41.9 ANXIETY: ICD-10-CM

## 2024-12-16 DIAGNOSIS — K21.9 GASTROESOPHAGEAL REFLUX DISEASE WITHOUT ESOPHAGITIS: ICD-10-CM

## 2024-12-16 DIAGNOSIS — E66.01 CLASS 2 SEVERE OBESITY WITH SERIOUS COMORBIDITY AND BODY MASS INDEX (BMI) OF 39.0 TO 39.9 IN ADULT, UNSPECIFIED OBESITY TYPE: ICD-10-CM

## 2024-12-16 DIAGNOSIS — R73.01 IFG (IMPAIRED FASTING GLUCOSE): ICD-10-CM

## 2024-12-16 DIAGNOSIS — E78.2 MIXED HYPERLIPIDEMIA: Primary | ICD-10-CM

## 2024-12-16 DIAGNOSIS — N52.9 ERECTILE DYSFUNCTION, UNSPECIFIED ERECTILE DYSFUNCTION TYPE: ICD-10-CM

## 2024-12-16 DIAGNOSIS — F32.5 MAJOR DEPRESSIVE DISORDER WITH SINGLE EPISODE, IN FULL REMISSION (CMS-HCC): ICD-10-CM

## 2024-12-16 DIAGNOSIS — E66.812 CLASS 2 SEVERE OBESITY WITH SERIOUS COMORBIDITY AND BODY MASS INDEX (BMI) OF 39.0 TO 39.9 IN ADULT, UNSPECIFIED OBESITY TYPE: ICD-10-CM

## 2024-12-16 PROCEDURE — 3008F BODY MASS INDEX DOCD: CPT | Performed by: FAMILY MEDICINE

## 2024-12-16 PROCEDURE — 99214 OFFICE O/P EST MOD 30 MIN: CPT | Performed by: FAMILY MEDICINE

## 2024-12-16 RX ORDER — HYDROGEN PEROXIDE 3 %
20 SOLUTION, NON-ORAL MISCELLANEOUS
Qty: 90 CAPSULE | Refills: 0 | Status: SHIPPED | OUTPATIENT
Start: 2024-12-16 | End: 2025-03-16

## 2024-12-16 RX ORDER — NAPROXEN SODIUM 220 MG/1
81 TABLET, FILM COATED ORAL DAILY
Qty: 90 TABLET | Refills: 0 | Status: SHIPPED | OUTPATIENT
Start: 2024-12-16

## 2024-12-16 ASSESSMENT — PATIENT HEALTH QUESTIONNAIRE - PHQ9
1. LITTLE INTEREST OR PLEASURE IN DOING THINGS: NOT AT ALL
2. FEELING DOWN, DEPRESSED OR HOPELESS: NOT AT ALL
SUM OF ALL RESPONSES TO PHQ9 QUESTIONS 1 AND 2: 0

## 2024-12-16 NOTE — PATIENT INSTRUCTIONS
Follow up in 3 months with labs to be done PRIOR.    It was a pleasure to see you today. Thank you for choosing us for your health care needs.    If you have lab or other testing completed and have not been informed of results within one week, please call the office for your results.    If you haven't done so, consider signing up for [facility] [employer], the [facility] personal health record. Ask the staff how you can get started.

## 2024-12-16 NOTE — PROGRESS NOTES
Subjective   Patient ID: Orlando Washington is a very pleasant 49 y.o. male who presents for Follow-up.    HPI     No concerns at this time.     No recent BW  PSA: 09/06/2024  Colonoscopy: 1/2021    Patient has hyperlipidemia.  He does try to limit the amount of fatty foods and high cholesterol foods that are consumed.  Pt has been compliant with rosuvastatin and denies any noted side effects.     He has CAD.  Pt underwent PCI with stenting in the past.  He follows with cardiology on a regular basis.  He denies any chest pain, or exercise intolerance.     Pt has vitamin D deficiency.  He is compliant with his vitamin d supplement.      He has anxiety.  Patient reports that his anxiety has been stable on the current medications.     Pt has GERD.   Patient states that PPIs working well to control symptoms and denies any breakthrough heartburn.      Pt has ALHAJI.  He has been trying to utilize it nightly.  He is still unable to use his CPAP all night due to taking it off (during the night) regardless of what mask he puts on.     Has known fatty liver Dz with elevated LFTs.  Was evaluated by hepatology in the past.    Review of Systems    Cardiovascular: Patient denies any chest pain, shortness of breath with exertion, tachycardia, palpitations, orthopnea, or paroxysmal nocturnal dyspnea.  Respiratory: Patient denies any cough, shortness breath, or wheezing.  Gastrointestinal: Patient denies any nausea, vomiting, diarrhea, constipation, melena, hematochezia, or reflux symptoms  Skin: Denies any rashes or skin lesions  Neurology: Patient denies any new motor or sensory losses. Denies any numbness, tingling, weakness, and incoordination of the extremities. Patient also denies any tremor, seizures, or gait instability.  Endocrinology: Denies any polyuria, polydipsia, polyphagia, or heat/cold intolerance.  Psychiatric: He denies any depression, anxiety, or suicidal/homicidal ideation.    Objective   /85   Pulse 69   Temp  "37.1 °C (98.8 °F)   Ht 1.778 m (5' 10\")   Wt 124 kg (272 lb 12.8 oz)   SpO2 97%   BMI 39.14 kg/m²     Physical Exam    General Appearance: Alert and cooperative, in no acute distress, well-developed/well-nourished obese male  Neck: Supple and without adenopathy or rigidity. There is no JVD at 90° and no carotid bruits are noted. There is no thyromegaly, thyroid tenderness, or palpable thyroid nodules.  Heart: Regular rate and rhythm without murmur or ectopy.  Lungs: Clear to auscultation bilaterally with good air exchange.  Skin: Good turgor, moist, warm and without rashes or lesions.  Neurological exam: Alert and oriented ×3, no tremor, normal gait.  Extremities: No clubbing, cyanosis, or edema  Psychiatric: Appropriate mood and affect, good insight and judgment, no delusions or thought disorders, no suicidal or homicidal ideation    Assessment/Plan     Hyperlipidemia:   Stable based on last labs.  Patient will continue with the current medication.  Dietary changes, exercise, and maintenance of healthy weight were discussed at length.  Lab Results   Component Value Date    CHOL 129 09/06/2024    CHOL 122 12/02/2023    CHOL 128 02/18/2023     Lab Results   Component Value Date    HDL 30.0 09/06/2024    HDL 28.8 12/02/2023    HDL 25.5 (A) 02/18/2023     Lab Results   Component Value Date    LDLCALC 47 09/06/2024    LDLCALC 38 12/02/2023     Lab Results   Component Value Date    TRIG 262 (H) 09/06/2024    TRIG 275 (H) 12/02/2023    TRIG 316 (H) 02/18/2023   - Comprehensive Metabolic Panel; Future  - Lipid Panel; Future     Coronary artery disease:   Patient is without worrisome signs or symptoms for unstable angina.  Risk factor modification was discussed at length.  Dietary changes, exercise and maintenance of a healthy weight were discussed.  - aspirin 81 mg chewable tablet; Chew 1 tablet (81 mg) once daily.  Dispense: 90 tablet; Refill: 0  - Lipid Panel; Future     Vitamin D deficiency:  Stable based on most " recent labs.  We will continue the current dose of vitamin D supplementation.  Lab Results   Component Value Date    VITD25 74 09/06/2024    VITD25 71 02/18/2023    VITD25 42 12/14/2021          Erectile dysfunction:  Stable based on symptoms.  Continue the sildenafil as needed.     Anxiety/depression:  Stable based on symptoms.  We will continue the current medications.    GERD:  Stable based on symptoms.  Appropriate dietary changes encouraged.  Continue the current treatment plan.  - esomeprazole (NexIUM) 20 mg DR capsule; Take 1 capsule (20 mg) by mouth once daily in the morning. Take before meals.  Dispense: 90 capsule; Refill: 0      Obesity:   Dietary changes, exercise, and maintenance of a healthy weight were discussed at length.  Goal is to achieve a BMI less than 25.    IFG (impaired fasting glucose)  Lab Results   Component Value Date    HGBA1C 6.0 (A) 12/27/2022   Stable based on last labs.  - Comprehensive Metabolic Panel; Future  - Hemoglobin A1C; Future        Follow up in three months- with labs to be done prior to visit.     Scribe Attestation  By signing my name below, IVenus Scribe   attest that this documentation has been prepared under the direction and in the presence of Kulwant Maxwell DO.    Requested Prescriptions     Pending Prescriptions Disp Refills    esomeprazole (NexIUM) 20 mg DR capsule 90 capsule 0     Sig: Take 1 capsule (20 mg) by mouth once daily in the morning. Take before meals.    aspirin 81 mg chewable tablet 90 tablet 0     Sig: Chew 1 tablet (81 mg) once daily.     Orders Placed This Encounter   Procedures    Comprehensive Metabolic Panel    Lipid Panel    Hemoglobin A1C         .

## 2025-02-10 DIAGNOSIS — E78.1 HYPERTRIGLYCERIDEMIA: ICD-10-CM

## 2025-02-10 DIAGNOSIS — E78.2 MIXED HYPERLIPIDEMIA: ICD-10-CM

## 2025-02-10 RX ORDER — ICOSAPENT ETHYL 1 G/1
1 CAPSULE ORAL
Qty: 180 CAPSULE | Refills: 3 | Status: SHIPPED | OUTPATIENT
Start: 2025-02-10

## 2025-02-10 RX ORDER — ROSUVASTATIN CALCIUM 20 MG/1
20 TABLET, COATED ORAL DAILY
Qty: 90 TABLET | Refills: 3 | Status: SHIPPED | OUTPATIENT
Start: 2025-02-10

## 2025-02-18 DIAGNOSIS — F41.9 ANXIETY: ICD-10-CM

## 2025-02-18 RX ORDER — BUSPIRONE HYDROCHLORIDE 15 MG/1
15 TABLET ORAL 2 TIMES DAILY
Qty: 60 TABLET | Refills: 0 | Status: SHIPPED | OUTPATIENT
Start: 2025-02-18

## 2025-03-11 ENCOUNTER — APPOINTMENT (OUTPATIENT)
Dept: CARDIOLOGY | Facility: CLINIC | Age: 50
End: 2025-03-11
Payer: COMMERCIAL

## 2025-03-11 VITALS
SYSTOLIC BLOOD PRESSURE: 120 MMHG | BODY MASS INDEX: 39.51 KG/M2 | WEIGHT: 276 LBS | HEART RATE: 84 BPM | DIASTOLIC BLOOD PRESSURE: 74 MMHG | HEIGHT: 70 IN

## 2025-03-11 DIAGNOSIS — R73.03 PREDIABETES: ICD-10-CM

## 2025-03-11 DIAGNOSIS — I25.10 CORONARY ARTERY DISEASE INVOLVING NATIVE CORONARY ARTERY OF NATIVE HEART, UNSPECIFIED WHETHER ANGINA PRESENT: Primary | ICD-10-CM

## 2025-03-11 DIAGNOSIS — E78.1 HYPERTRIGLYCERIDEMIA: ICD-10-CM

## 2025-03-11 DIAGNOSIS — E78.2 MIXED HYPERLIPIDEMIA: ICD-10-CM

## 2025-03-11 DIAGNOSIS — I25.10 CORONARY ARTERY DISEASE INVOLVING NATIVE CORONARY ARTERY OF NATIVE HEART, UNSPECIFIED WHETHER ANGINA PRESENT: ICD-10-CM

## 2025-03-11 DIAGNOSIS — E78.49 OTHER HYPERLIPIDEMIA: ICD-10-CM

## 2025-03-11 LAB
ALBUMIN SERPL-MCNC: 4.4 G/DL (ref 3.6–5.1)
ALP SERPL-CCNC: 62 U/L (ref 35–144)
ALT SERPL-CCNC: 34 U/L (ref 9–46)
ANION GAP SERPL CALCULATED.4IONS-SCNC: 9 MMOL/L (CALC) (ref 7–17)
AST SERPL-CCNC: 24 U/L (ref 10–35)
BILIRUB SERPL-MCNC: 0.5 MG/DL (ref 0.2–1.2)
BUN SERPL-MCNC: 15 MG/DL (ref 7–25)
CALCIUM SERPL-MCNC: 9.7 MG/DL (ref 8.6–10.3)
CHLORIDE SERPL-SCNC: 106 MMOL/L (ref 98–110)
CHOLEST SERPL-MCNC: 111 MG/DL
CHOLEST/HDLC SERPL: 3.3 (CALC)
CO2 SERPL-SCNC: 27 MMOL/L (ref 20–32)
CREAT SERPL-MCNC: 1 MG/DL (ref 0.7–1.3)
EGFRCR SERPLBLD CKD-EPI 2021: 92 ML/MIN/1.73M2
EST. AVERAGE GLUCOSE BLD GHB EST-MCNC: 126 MG/DL
EST. AVERAGE GLUCOSE BLD GHB EST-SCNC: 7 MMOL/L
GLUCOSE SERPL-MCNC: 92 MG/DL (ref 65–99)
HBA1C MFR BLD: 6 % OF TOTAL HGB
HDLC SERPL-MCNC: 34 MG/DL
LDLC SERPL CALC-MCNC: 48 MG/DL (CALC)
NONHDLC SERPL-MCNC: 77 MG/DL (CALC)
POTASSIUM SERPL-SCNC: 4.6 MMOL/L (ref 3.5–5.3)
PROT SERPL-MCNC: 7.1 G/DL (ref 6.1–8.1)
SODIUM SERPL-SCNC: 142 MMOL/L (ref 135–146)
TRIGL SERPL-MCNC: 220 MG/DL

## 2025-03-11 PROCEDURE — 1036F TOBACCO NON-USER: CPT | Performed by: INTERNAL MEDICINE

## 2025-03-11 PROCEDURE — 3008F BODY MASS INDEX DOCD: CPT | Performed by: INTERNAL MEDICINE

## 2025-03-11 PROCEDURE — 99215 OFFICE O/P EST HI 40 MIN: CPT | Performed by: INTERNAL MEDICINE

## 2025-03-11 RX ORDER — ROSUVASTATIN CALCIUM 20 MG/1
20 TABLET, COATED ORAL DAILY
Qty: 90 TABLET | Refills: 3 | Status: SHIPPED | OUTPATIENT
Start: 2025-03-11

## 2025-03-11 RX ORDER — ICOSAPENT ETHYL 1 G/1
1 CAPSULE ORAL
Qty: 180 CAPSULE | Refills: 3 | Status: SHIPPED | OUTPATIENT
Start: 2025-03-11

## 2025-03-11 NOTE — ASSESSMENT & PLAN NOTE
Orders:    semaglutide (Ozempic) 0.25 mg or 0.5 mg(2 mg/1.5 mL) pen injector; Inject 0.25 mg under the skin every 7 days.    Follow Up In Cardiology; Future

## 2025-03-11 NOTE — PATIENT INSTRUCTIONS

## 2025-03-11 NOTE — ASSESSMENT & PLAN NOTE
Orders:    icosapent ethyL (Vascepa) 1 gram capsule; Take 1 capsule (1 g) by mouth 2 times daily (morning and late afternoon).    Follow Up In Cardiology; Future

## 2025-03-11 NOTE — PROGRESS NOTES
"Chief Complaint:   No chief complaint on file.     History Of Present Illness:    Orlando Washington is a 50 y.o. male presenting with CAD.    This 50-year-old hypertensive, prediabetic, hyperlipidemic, hypertriglyceridemic man with a BMI of 39.6, and coronary artery disease returns to the office in routine follow-up. The patient is status post prior drug-eluting stent of the LAD in December 2019 along with PTCA of the diagonal branch. He has a 50% stenosis in the mid RCA, managed medically, and has normal LV function.  The patient's stress test on January 4, 2022 disclosed no ischemia at 8.5 METS.    The patient denies chest discomfort, dyspnea, palpitations, orthopnea, PND, syncope, and near syncope.  He has cut out sugar containing beverages, but unfortunately he is still very fond of starchy foods such as pasta, bread, and potatoes.  He walks for 30 minutes every other day.  He works as a .  His work entails a great deal of physical effort, and he is able to keep up with the demands of the job.         Last Recorded Vitals:  Vitals:    03/11/25 1500   BP: 120/74   BP Location: Left arm   Patient Position: Sitting   Pulse: 84   Weight: 125 kg (276 lb)   Height: 1.778 m (5' 10\")       Past Medical History:  He has a past medical history of Personal history of other diseases of the circulatory system (01/14/2020) and Personal history of other specified conditions (01/14/2020).    Past Surgical History:  He has a past surgical history that includes Other surgical history (01/14/2021); Other surgical history (12/10/2020); Other surgical history (12/10/2020); Other surgical history (12/10/2020); Other surgical history (12/10/2020); and Distal biceps tendon repair (Right, 07/15/2024).      Social History:  He reports that he quit smoking about 7 years ago. His smoking use included cigarettes. He has never used smokeless tobacco. He reports that he does not currently use alcohol. He reports that he does not " currently use drugs after having used the following drugs: Marijuana.    Family History:  Family History   Problem Relation Name Age of Onset    Supraventricular tachycardia Mother      Heart disease Father      Heart attack Brother      Other (Hole in Heart.) Mother's Brother      Heart murmur Son Andrei         Allergies:  Venlafaxine    Outpatient Medications:  Current Outpatient Medications   Medication Instructions    aspirin 81 mg, oral, Daily    atenolol (TENORMIN) 25 mg, oral, Daily    busPIRone (BUSPAR) 15 mg, oral, 2 times daily    cholecalciferol (VITAMIN D-3) 50 mcg, Daily    esomeprazole (NEXIUM) 20 mg, oral, Daily before breakfast    hydrocortisone-pramoxine (Proctofoam HC) rectal foam 1 applicator, rectal, 2 times daily    icosapent ethyL (VASCEPA) 1 g, oral, 2 times daily (morning and late afternoon)    psyllium seed, with sugar, (FIBER SUPPLEMENT ORAL) once a day    rosuvastatin (CRESTOR) 20 mg, oral, Daily    sertraline (ZOLOFT) 100 mg, oral, Daily    sildenafil (VIAGRA) 100 mg, oral, Daily PRN       Physical Exam:  GENERAL:  pleasant 50 year-old  HEENT: No xanthelasma  NECK: Supple, no palpable adenopathy or thyromegaly  CHEST: Clear to auscultation, respiratory effort unlabored  CARDIAC: RRR, normal S1 and S2, no audible murmur, rub, gallop, carotids are brisk, PMI is not displaced  ABD: Active bowel sounds, nontender, no organomegaly, no evidence of ascites  EXT: No clubbing, cyanosis, edema, or tenderness  NEURO: Awake, alert, appropriate, speech is fluent       Last Labs:  CBC -  Lab Results   Component Value Date    WBC 6.4 12/14/2021    HGB 16.2 12/14/2021    HCT 48.4 12/14/2021    MCV 92 12/14/2021     12/14/2021       CMP -  Lab Results   Component Value Date    CALCIUM 9.7 03/10/2025    PROT 7.1 03/10/2025    ALBUMIN 4.4 03/10/2025    AST 24 03/10/2025    ALT 34 03/10/2025    ALKPHOS 62 03/10/2025    BILITOT 0.5 03/10/2025       LIPID PANEL -   Lab Results   Component Value Date     CHOL 111 03/10/2025    TRIG 220 (H) 03/10/2025    HDL 34 (L) 03/10/2025    CHHDL 3.3 03/10/2025    LDLF 39 02/18/2023    VLDL 52 (H) 09/06/2024    NHDL 77 03/10/2025       RENAL FUNCTION PANEL -   Lab Results   Component Value Date    GLUCOSE 92 03/10/2025     03/10/2025    K 4.6 03/10/2025     03/10/2025    CO2 27 03/10/2025    ANIONGAP 9 03/10/2025    BUN 15 03/10/2025    CREATININE 1.00 03/10/2025    GFRMALE >90 02/18/2023    CALCIUM 9.7 03/10/2025    ALBUMIN 4.4 03/10/2025        Lab Results   Component Value Date    BNP 21 12/27/2019    HGBA1C 6.0 (H) 03/10/2025         Lab review: I have Chemistry CMP:   Lab Results   Component Value Date    ALBUMIN 4.4 03/10/2025    CALCIUM 9.7 03/10/2025    CO2 27 03/10/2025    CREATININE 1.00 03/10/2025    GLUCOSE 92 03/10/2025    BILITOT 0.5 03/10/2025    PROT 7.1 03/10/2025    ALT 34 03/10/2025    AST 24 03/10/2025    ALKPHOS 62 03/10/2025   , Chemistry BMP   Lab Results   Component Value Date    GLUCOSE 92 03/10/2025    CALCIUM 9.7 03/10/2025    CO2 27 03/10/2025    CREATININE 1.00 03/10/2025   , CBC:  Lab Results   Component Value Date    WBC 6.4 12/14/2021    RBC 5.27 12/14/2021    HGB 16.2 12/14/2021    HCT 48.4 12/14/2021    MCV 92 12/14/2021    MCHC 33.5 12/14/2021    RDW 12.1 12/14/2021    NRBC 0.0 12/27/2019   , and Lipids:   Lab Results   Component Value Date    CHOL 111 03/10/2025    HDL 34 (L) 03/10/2025    LDLCALC 48 03/10/2025    TRIG 220 (H) 03/10/2025       Assessment/Plan   Assessment & Plan  Coronary artery disease involving native coronary artery of native heart, unspecified whether angina present  In the context of his prediabetes, BMI of 39.6, and prior history of CAD, I have prescribed a GLP-1 agonist.  This class of medications has a known survival benefit in such populations.  We discussed potential risks of the medication and he understands and wishes to proceed.  He has no prior history of thyroid carcinoma, pancreatitis, or other  hepatobiliary disease.  There is no family history of thyroid carcinoma.  I suggested that he also speak with his primary care physician about this.  Orders:    semaglutide (Ozempic) 0.25 mg or 0.5 mg(2 mg/1.5 mL) pen injector; Inject 0.25 mg under the skin every 7 days.    Follow Up In Cardiology; Future    Other hyperlipidemia    Orders:    Follow Up In Cardiology; Future    Hypertriglyceridemia    Orders:    icosapent ethyL (Vascepa) 1 gram capsule; Take 1 capsule (1 g) by mouth 2 times daily (morning and late afternoon).    Follow Up In Cardiology; Future    BMI 39.0-39.9,adult    Orders:    semaglutide (Ozempic) 0.25 mg or 0.5 mg(2 mg/1.5 mL) pen injector; Inject 0.25 mg under the skin every 7 days.    Follow Up In Cardiology; Future    Mixed hyperlipidemia    Orders:    rosuvastatin (Crestor) 20 mg tablet; Take 1 tablet (20 mg) by mouth once daily.    Prediabetes  In the context of his prediabetes, BMI of 39.6, and prior history of CAD, I have prescribed a GLP-1 agonist.  This class of medications has a known survival benefit in such populations.  We discussed potential risks of the medication and he understands and wishes to proceed.  He has no prior history of thyroid carcinoma, pancreatitis, or other hepatobiliary disease.  There is no family history of thyroid carcinoma.  I suggested that he also speak with his primary care physician about this.  Orders:    semaglutide (Ozempic) 0.25 mg or 0.5 mg(2 mg/1.5 mL) pen injector; Inject 0.25 mg under the skin every 7 days.    Follow Up In Cardiology; Future          Armin Nava MD

## 2025-03-11 NOTE — ASSESSMENT & PLAN NOTE
In the context of his prediabetes, BMI of 39.6, and prior history of CAD, I have prescribed a GLP-1 agonist.  This class of medications has a known survival benefit in such populations.  We discussed potential risks of the medication and he understands and wishes to proceed.  He has no prior history of thyroid carcinoma, pancreatitis, or other hepatobiliary disease.  There is no family history of thyroid carcinoma.  I suggested that he also speak with his primary care physician about this.  Orders:    semaglutide (Ozempic) 0.25 mg or 0.5 mg(2 mg/1.5 mL) pen injector; Inject 0.25 mg under the skin every 7 days.    Follow Up In Cardiology; Future

## 2025-03-12 RX ORDER — SEMAGLUTIDE 0.68 MG/ML
INJECTION, SOLUTION SUBCUTANEOUS
Qty: 3 ML | Refills: 5 | Status: SHIPPED | OUTPATIENT
Start: 2025-03-12

## 2025-03-18 ENCOUNTER — APPOINTMENT (OUTPATIENT)
Dept: PRIMARY CARE | Facility: CLINIC | Age: 50
End: 2025-03-18
Payer: COMMERCIAL

## 2025-03-18 ENCOUNTER — TELEPHONE (OUTPATIENT)
Dept: CARDIOLOGY | Facility: CLINIC | Age: 50
End: 2025-03-18

## 2025-03-18 NOTE — TELEPHONE ENCOUNTER
I did a Prior Auth for the new prescription Ozempic via [email or web address]    Determination = Denied     Reason for denial = The patient would need to have a diagnosis of type 2 diabetes confirmed by submitted laboratory documentation.  Additionally, it would need to be confirmed that the patient will not be using a dipeptidyl peptidase-4 (DPP-4) inhibitor for type 2 diabetes or another glucagon-like peptide-1 (GLP-1) receptor agonist agent concurrently.    I spoke with Orlando regarding the above, he demonstrated a good understanding.    Dr. Nava,   To submit a standard internal appeal, we must submit the request in writing (letter).       The letter must present evidence. testimony and additional records or information. A written statement relating to the requested medication and how you believe it should be covered under the patient's plan (within 180 days from 3/18/25).    The letter and all information is to be mailed to....    To [facility] [employer] Appeals Department Attn: Board of Trustees  [address], [address]  [place] [address]    Or faxed to [phone].    Cover sheet must state....  To [facility] [employer] Appeals Department Attn: Board of Trustees.    In the comment area, must state.... Requesting an internal appeal for Ozempic 0.25 or 0.5 pen injctr, request ID [id number], cardholder ID [id number].    Call phone # [phone] with any questions.    To Dr. Nava for review.  ---ssd.

## 2025-04-09 ENCOUNTER — APPOINTMENT (OUTPATIENT)
Dept: PRIMARY CARE | Facility: CLINIC | Age: 50
End: 2025-04-09
Payer: COMMERCIAL

## 2025-04-09 VITALS
OXYGEN SATURATION: 96 % | SYSTOLIC BLOOD PRESSURE: 128 MMHG | HEIGHT: 70 IN | WEIGHT: 281.3 LBS | HEART RATE: 91 BPM | BODY MASS INDEX: 40.27 KG/M2 | DIASTOLIC BLOOD PRESSURE: 84 MMHG | RESPIRATION RATE: 14 BRPM

## 2025-04-09 DIAGNOSIS — E66.813 CLASS 3 SEVERE OBESITY WITH SERIOUS COMORBIDITY AND BODY MASS INDEX (BMI) OF 40.0 TO 44.9 IN ADULT, UNSPECIFIED OBESITY TYPE: ICD-10-CM

## 2025-04-09 DIAGNOSIS — I25.10 CORONARY ARTERY DISEASE INVOLVING NATIVE CORONARY ARTERY OF NATIVE HEART WITHOUT ANGINA PECTORIS: ICD-10-CM

## 2025-04-09 DIAGNOSIS — F41.9 ANXIETY: ICD-10-CM

## 2025-04-09 DIAGNOSIS — F32.5 MAJOR DEPRESSIVE DISORDER WITH SINGLE EPISODE, IN FULL REMISSION (CMS-HCC): ICD-10-CM

## 2025-04-09 DIAGNOSIS — E78.2 MIXED HYPERLIPIDEMIA: ICD-10-CM

## 2025-04-09 DIAGNOSIS — R73.01 IFG (IMPAIRED FASTING GLUCOSE): ICD-10-CM

## 2025-04-09 DIAGNOSIS — E55.9 VITAMIN D DEFICIENCY: ICD-10-CM

## 2025-04-09 DIAGNOSIS — Z00.00 WELL ADULT EXAM: Primary | ICD-10-CM

## 2025-04-09 DIAGNOSIS — E66.01 CLASS 3 SEVERE OBESITY WITH SERIOUS COMORBIDITY AND BODY MASS INDEX (BMI) OF 40.0 TO 44.9 IN ADULT, UNSPECIFIED OBESITY TYPE: ICD-10-CM

## 2025-04-09 DIAGNOSIS — N52.9 ERECTILE DYSFUNCTION, UNSPECIFIED ERECTILE DYSFUNCTION TYPE: ICD-10-CM

## 2025-04-09 DIAGNOSIS — K21.9 GASTROESOPHAGEAL REFLUX DISEASE WITHOUT ESOPHAGITIS: ICD-10-CM

## 2025-04-09 PROCEDURE — 99396 PREV VISIT EST AGE 40-64: CPT | Performed by: FAMILY MEDICINE

## 2025-04-09 PROCEDURE — 3008F BODY MASS INDEX DOCD: CPT | Performed by: FAMILY MEDICINE

## 2025-04-09 PROCEDURE — 99214 OFFICE O/P EST MOD 30 MIN: CPT | Performed by: FAMILY MEDICINE

## 2025-04-09 RX ORDER — SEMAGLUTIDE 0.25 MG/.5ML
0.25 INJECTION, SOLUTION SUBCUTANEOUS WEEKLY
Qty: 2 ML | Refills: 0 | Status: SHIPPED | OUTPATIENT
Start: 2025-04-09 | End: 2025-05-01

## 2025-04-09 RX ORDER — BUSPIRONE HYDROCHLORIDE 15 MG/1
15 TABLET ORAL 2 TIMES DAILY
Qty: 60 TABLET | Refills: 0 | Status: SHIPPED | OUTPATIENT
Start: 2025-04-09

## 2025-04-09 RX ORDER — SERTRALINE HYDROCHLORIDE 25 MG/1
100 TABLET, FILM COATED ORAL DAILY
Qty: 90 TABLET | Refills: 0 | Status: SHIPPED | OUTPATIENT
Start: 2025-04-09 | End: 2025-04-15 | Stop reason: SDUPTHER

## 2025-04-09 RX ORDER — NAPROXEN SODIUM 220 MG/1
81 TABLET, FILM COATED ORAL DAILY
Qty: 90 TABLET | Refills: 0 | Status: SHIPPED | OUTPATIENT
Start: 2025-04-09

## 2025-04-09 NOTE — PATIENT INSTRUCTIONS
Start the Wegovy as we discussed.  Call for the next dose if approved.    Follow up in 3 months.    It was a pleasure to see you today. Thank you for choosing us for your health care needs.    If you have lab or other testing completed and have not been informed of results within one week, please call the office for your results.    If you haven't done so, consider signing up for [facility] [employer], the [facility] personal health record. Ask the staff how you can get started.

## 2025-04-09 NOTE — PROGRESS NOTES
Subjective   Patient ID: Orlando Washington is a 50 y.o. male who presents for Annual Exam (Physical).    HPI     Labs: 3/10/2025  PSA: 9/2024  Colonoscopy: 1/2021    Patient has hyperlipidemia.  He does try to limit the amount of fatty foods and high cholesterol foods that are consumed.  Pt has been compliant with rosuvastatin and denies any noted side effects.     He has CAD.  Pt underwent PCI with stenting in the past.  He follows with cardiology on a regular basis.  He denies any chest pain, or exercise intolerance.  Pt has a very strong family history of CAD.  His father has known CAD and his brother had a myocardial infarction at the age of 49.     Pt has vitamin D deficiency.  He is compliant with his vitamin d supplement.      He has anxiety.  Patient reports that his anxiety has been stable on the current medications.     Pt has GERD.   Patient states that PPIs working well to control symptoms and denies any breakthrough heartburn.      Pt has ALHAJI.  He has been trying to utilize it nightly.  He is still unable to use his CPAP all night due to taking it off (during the night) regardless of what mask he puts on.     Has known fatty liver Dz with elevated LFTs.  Was evaluated by hepatology in the past.    Review of Systems  Constitutional: Patient denies any fever, chills, loss of appetite, or unexplained weight loss.  HEENT: Denies any headache, sore throat, eye pain, ear pain, decreased vision, or decreased hearing.  Patient also denies any rhinorrhea.  Cardiovascular: Patient denies any chest pain, shortness of breath with exertion, tachycardia, palpitations, orthopnea, or paroxysmal nocturnal dyspnea.  Respiratory: Patient denies any cough, shortness breath, or wheezing.  Gastrointestinal: Patient denies any nausea, vomiting, diarrhea, constipation, melena, hematochezia, or reflux symptoms.  Musculoskeletal: Patient denies any myalgia, arthralgia, joint swelling, or joint deformity   Skin: Denies any rashes or  "skin lesions.   Neurology: Patient denies any new motor or sensory losses.  Denies any numbness, tingling, weakness, and incoordination of the extremities.  Patient also denies any tremor, seizures, or gait instability.  Endocrinology: Denies any polyuria, polydipsia, polyphagia, or heat/cold intolerance.  Psychiatric: Denies any anxiety, depression, or suicidal/homicidal ideation.  Hematology: Patient denies any abnormal bruising or bleeding.      Objective   /84   Pulse 91   Resp 14   Ht 1.778 m (5' 10\")   Wt 128 kg (281 lb 4.8 oz)   SpO2 96%   BMI 40.36 kg/m²     Physical Exam  Gen. appearance: Alert and cooperative, no acute distress, well-developed/well-nourished, obese male.    Head: Normocephalic and atraumatic.  EENT: Pupils are equal round reactive to light, extraocular muscles are intact, mucous membranes are moist, external auditory canals and tympanic membranes are within normal limits bilaterally, pharynx is without erythema or exudate, there is no noted rhinorrhea.  Neck: Supple and without adenopathy, no JVD at 90° and no carotid bruits are noted.  There is no thyromegaly, thyroid tenderness, or palpable thyroid nodules.  Cardiovascular: Regular rate and rhythm without murmur or ectopy.  Respiratory: Lungs are clear to auscultation bilaterally with good air exchange.  Good respiratory effort and no accessory muscle use.  Abdomen: Protuberant, soft, nontender/nondistended.  No masses, guarding, rebound, or rigidity.  No hepatosplenomegaly, abdominal bruits, or CVA tenderness.  Bowel sounds are normal.  There is no widening of the aortic pulsation.  Musculoskeletal: Patient has good range of motion of the shoulders, elbows, wrists, hips, knees.  There are no noted joint effusions or deformities.  Skin: Good turgor, moist, warm and without rashes or lesions.  Lymph nodes: No cervical, clavicular, or inguinal adenopathy.  Neurological exam: Alert and oriented ×3, no tremor, normal " gait.  Psychiatric: Appropriate mood and affect, good insight and judgment, no delusions or thought disorders, no suicidal or homicidal ideation.  Extremities: No clubbing, cyanosis, or edema       Assessment/Plan     Well Exam:    Appropriate screenings for the patient's current age were discussed at length.  I encouraged a low-fat/low-cholesterol diet and routine exercise.    Hyperlipidemia:   Stable based on last labs.  Patient will continue with the current medication.  Dietary changes, exercise, and maintenance of healthy weight were discussed at length.    Lab Results   Component Value Date    CHOL 111 03/10/2025    CHOL 129 09/06/2024    CHOL 122 12/02/2023     Lab Results   Component Value Date    HDL 34 (L) 03/10/2025    HDL 30.0 09/06/2024    HDL 28.8 12/02/2023     Lab Results   Component Value Date    LDLCALC 48 03/10/2025    LDLCALC 47 09/06/2024    LDLCALC 38 12/02/2023     Lab Results   Component Value Date    TRIG 220 (H) 03/10/2025    TRIG 262 (H) 09/06/2024    TRIG 275 (H) 12/02/2023       Coronary artery disease:   Patient is without worrisome signs or symptoms for unstable angina.  Risk factor modification was discussed at length.  Dietary changes, exercise and maintenance of a healthy weight were discussed.  This 50-year-old hypertensive, prediabetic, hyperlipidemic, man with a BMI of 40.36.  The patient is status post prior drug-eluting stent of the LAD in December 2019 along with PTCA of the diagonal branch. He has a 50% stenosis in the mid RCA, managed medically, and has normal LV function.  The patient's stress test on January 4, 2022 disclosed no ischemia at 8.5 METS.    Orlando has a very strong famliy history of coronary artery disease in his father and brother.  His brother had a heart attack at age 48.    Pt is currently maintained on rosuvastatin and Vascepa for his hyperlipidemia and CAD risk reduction.  He is also maintained on 81 mg of aspirin daily.    Pt would greatly benefit from  additional cardiovascular risk reduction given his hx and family hx.  We will start him on Wegovy which is FDA approved for cardiovascular event risk reduction in overweight or obese patients with established cardiovascular disease.         Vitamin D deficiency:  Stable based on most recent labs.  We will continue the current dose of vitamin D supplementation.  Lab Results   Component Value Date    VITD25 74 09/06/2024    VITD25 71 02/18/2023    VITD25 42 12/14/2021            Erectile dysfunction:  Stable based on symptoms.  Continue the sildenafil as needed.     Anxiety/depression:  Stable based on symptoms.  We will continue the current medications.    GERD:  Stable based on symptoms.  Appropriate dietary changes encouraged.  Continue the current treatment plan.  - esomeprazole (NexIUM) 20 mg DR capsule; Take 1 capsule (20 mg) by mouth once daily in the morning. Take before meals.  Dispense: 90 capsule; Refill: 0      Obesity:   Dietary changes, exercise, and maintenance of a healthy weight were discussed at length.  Goal is to achieve a BMI less than 25.    IFG (impaired fasting glucose)  Stable based on last labs.  Lab Results   Component Value Date    HGBA1C 6.0 (H) 03/10/2025   Appropriate dietary changes and weight loss were discussed at length.  We discussed the weight loss would greatly reduce his risk of progressing to diabetes.

## 2025-04-14 ENCOUNTER — TELEPHONE (OUTPATIENT)
Dept: PRIMARY CARE | Facility: CLINIC | Age: 50
End: 2025-04-14
Payer: COMMERCIAL

## 2025-04-14 DIAGNOSIS — F41.9 ANXIETY: ICD-10-CM

## 2025-04-14 NOTE — TELEPHONE ENCOUNTER
GD patient  Pt is calling in and states his Sertraline is supposed to be written as 25 mg 1 x daily.Script was sent for 25 mg for 4 a day.  He doesn't want to take 4 pills, he would like it resent to [facility] in [place] on [address].Please advise pt when this is done.

## 2025-04-15 RX ORDER — SERTRALINE HYDROCHLORIDE 100 MG/1
100 TABLET, FILM COATED ORAL DAILY
Qty: 90 TABLET | Refills: 0 | Status: SHIPPED | OUTPATIENT
Start: 2025-04-15

## 2025-04-15 NOTE — TELEPHONE ENCOUNTER
Advise pt that an updated RX for the sertraline was sent for him.    I submitted additional information to try to get the Wegovy approve for him today and we will need to wait on the decision.    If coverage is denies, we can consider trying to get Zepbound approved as it is approved for weight loss but also as a treatment for obstructive sleep apnea.

## 2025-04-15 NOTE — TELEPHONE ENCOUNTER
100 mg once daily to [facility]    Patient would like to know what you recommend since the Wegovy has been denied by his insurance. Pls advise.

## 2025-04-17 ENCOUNTER — TELEPHONE (OUTPATIENT)
Dept: CARDIOLOGY | Facility: CLINIC | Age: 50
End: 2025-04-17
Payer: COMMERCIAL

## 2025-04-17 NOTE — TELEPHONE ENCOUNTER
I called patient's drug plan ([employer] at [phone]) to get a status update on the Appeal (Prior Auth denied Ozempic) we sent them on 03/24/2025.    Per drug plan, our appeal still doesn't meet criteria, patient is not diabetic and they will only cover Ozempic for Type II Diabetes.    To Dr. Nava, what would you like to do?  ---ssd.

## 2025-05-13 ENCOUNTER — TELEPHONE (OUTPATIENT)
Dept: CARDIOLOGY | Facility: CLINIC | Age: 50
End: 2025-05-13
Payer: COMMERCIAL

## 2025-05-13 NOTE — TELEPHONE ENCOUNTER
Patient is requesting a new prescription of Ozempic, the prior authorization has been approved by his insurance.

## 2025-05-13 NOTE — TELEPHONE ENCOUNTER
Dr. Nava,   I spoke with Orlando to get verification from him.  We have that both Ozempic and Wegovy were denied by the insurance company.    Orlando states that his wife called [employer] and with Dr. Nava's information / documentation, she was able to get an Override on the Ozempic.    Orlando is requesting Wegovy be taken off his list and to have [facility] prescribed the Ozempic to his local [facility] in [place].    To Dr. Nava for approval.  ---ssd.

## 2025-05-14 ENCOUNTER — APPOINTMENT (OUTPATIENT)
Dept: PRIMARY CARE | Facility: CLINIC | Age: 50
End: 2025-05-14
Payer: COMMERCIAL

## 2025-05-16 DIAGNOSIS — E66.813 CLASS 3 SEVERE OBESITY WITH SERIOUS COMORBIDITY AND BODY MASS INDEX (BMI) OF 40.0 TO 44.9 IN ADULT: ICD-10-CM

## 2025-05-16 DIAGNOSIS — I25.10 CORONARY ARTERY DISEASE INVOLVING NATIVE CORONARY ARTERY OF NATIVE HEART, UNSPECIFIED WHETHER ANGINA PRESENT: Primary | ICD-10-CM

## 2025-05-16 RX ORDER — SEMAGLUTIDE 0.68 MG/ML
0.25 INJECTION, SOLUTION SUBCUTANEOUS
Qty: 3 ML | Refills: 10 | Status: SHIPPED | OUTPATIENT
Start: 2025-05-16

## 2025-07-10 ENCOUNTER — APPOINTMENT (OUTPATIENT)
Dept: PRIMARY CARE | Facility: CLINIC | Age: 50
End: 2025-07-10
Payer: COMMERCIAL

## 2025-07-10 VITALS
OXYGEN SATURATION: 94 % | TEMPERATURE: 97.5 F | HEART RATE: 79 BPM | SYSTOLIC BLOOD PRESSURE: 115 MMHG | WEIGHT: 272.8 LBS | HEIGHT: 70 IN | BODY MASS INDEX: 39.05 KG/M2 | DIASTOLIC BLOOD PRESSURE: 78 MMHG

## 2025-07-10 DIAGNOSIS — Z12.5 PROSTATE CANCER SCREENING: ICD-10-CM

## 2025-07-10 DIAGNOSIS — N52.9 ERECTILE DYSFUNCTION, UNSPECIFIED ERECTILE DYSFUNCTION TYPE: ICD-10-CM

## 2025-07-10 DIAGNOSIS — R73.01 IFG (IMPAIRED FASTING GLUCOSE): ICD-10-CM

## 2025-07-10 DIAGNOSIS — E55.9 VITAMIN D DEFICIENCY: ICD-10-CM

## 2025-07-10 DIAGNOSIS — E66.813 CLASS 3 SEVERE OBESITY WITH SERIOUS COMORBIDITY AND BODY MASS INDEX (BMI) OF 40.0 TO 44.9 IN ADULT, UNSPECIFIED OBESITY TYPE: ICD-10-CM

## 2025-07-10 DIAGNOSIS — F32.5 MAJOR DEPRESSIVE DISORDER WITH SINGLE EPISODE, IN FULL REMISSION: ICD-10-CM

## 2025-07-10 DIAGNOSIS — K21.9 GASTROESOPHAGEAL REFLUX DISEASE WITHOUT ESOPHAGITIS: ICD-10-CM

## 2025-07-10 DIAGNOSIS — F41.9 ANXIETY: ICD-10-CM

## 2025-07-10 DIAGNOSIS — E78.2 MIXED HYPERLIPIDEMIA: Primary | ICD-10-CM

## 2025-07-10 DIAGNOSIS — R11.0 NAUSEA: ICD-10-CM

## 2025-07-10 DIAGNOSIS — I25.10 CORONARY ARTERY DISEASE INVOLVING NATIVE CORONARY ARTERY OF NATIVE HEART WITHOUT ANGINA PECTORIS: ICD-10-CM

## 2025-07-10 PROCEDURE — 3008F BODY MASS INDEX DOCD: CPT | Performed by: FAMILY MEDICINE

## 2025-07-10 PROCEDURE — 99214 OFFICE O/P EST MOD 30 MIN: CPT | Performed by: FAMILY MEDICINE

## 2025-07-10 RX ORDER — SERTRALINE HYDROCHLORIDE 100 MG/1
100 TABLET, FILM COATED ORAL DAILY
Qty: 90 TABLET | Refills: 0 | Status: SHIPPED | OUTPATIENT
Start: 2025-07-10

## 2025-07-10 RX ORDER — NAPROXEN SODIUM 220 MG/1
81 TABLET, FILM COATED ORAL DAILY
Qty: 90 TABLET | Refills: 0 | Status: SHIPPED | OUTPATIENT
Start: 2025-07-10

## 2025-07-10 RX ORDER — BUSPIRONE HYDROCHLORIDE 15 MG/1
15 TABLET ORAL 2 TIMES DAILY
Qty: 60 TABLET | Refills: 0 | Status: SHIPPED | OUTPATIENT
Start: 2025-07-10

## 2025-07-10 RX ORDER — ATENOLOL 25 MG/1
25 TABLET ORAL DAILY
Qty: 90 TABLET | Refills: 0 | Status: SHIPPED | OUTPATIENT
Start: 2025-07-10

## 2025-07-10 RX ORDER — ICOSAPENT ETHYL 1 G/1
1 CAPSULE ORAL
Qty: 180 CAPSULE | Refills: 0 | Status: SHIPPED | OUTPATIENT
Start: 2025-07-10

## 2025-07-10 RX ORDER — HYDROGEN PEROXIDE 3 %
20 SOLUTION, NON-ORAL MISCELLANEOUS
Qty: 90 CAPSULE | Refills: 0 | Status: SHIPPED | OUTPATIENT
Start: 2025-07-10 | End: 2025-10-08

## 2025-07-10 RX ORDER — ONDANSETRON 4 MG/1
4 TABLET, FILM COATED ORAL EVERY 8 HOURS PRN
Qty: 20 TABLET | Refills: 0 | Status: SHIPPED | OUTPATIENT
Start: 2025-07-10

## 2025-07-10 NOTE — PROGRESS NOTES
Subjective   Patient ID: Orlando Washington is a 50 y.o. male who presents for Follow-up.    HPI     Patient has been started on Ozempic from Cardiology.   Currently on 0.5 mg of Ozempic.   Has side effects of acid reflux and mild nausea.  Takes Nexium for the acid reflux which is helpful.  He would like a medication to help with the nausea.       No recent BW  PSA: 09/2024  Colonoscopy: 01/2021      Patient has hyperlipidemia.  He does try to limit the amount of fatty foods and high cholesterol foods that are consumed.  Pt has been compliant with rosuvastatin and denies any noted side effects.     He has CAD.  Pt underwent PCI with stenting in the past.  He follows with cardiology on a regular basis.  He denies any chest pain, or exercise intolerance.  Pt has a very strong family history of CAD.  His father has known CAD and his brother had a myocardial infarction at the age of 49.     Pt has vitamin D deficiency.  He is compliant with his vitamin D supplement.      He has anxiety.  Patient reports that his anxiety has been stable on the current medications.     Pt has GERD.   Patient states that PPIs working well to control symptoms and denies any breakthrough heartburn.      Pt has ALHAJI.  He has been trying to utilize it nightly.  He is still unable to use his CPAP all night due to taking it off (during the night) regardless of what mask he puts on.     Has known fatty liver Dz with elevated LFTs.  Was evaluated by hepatology in the past.       Review of Systems  Constitutional: Patient denies any fever, chills, loss of appetite, or unexplained weight loss.  Cardiovascular: Patient denies any chest pain, shortness of breath with exertion, tachycardia, palpitations, orthopnea, or paroxysmal nocturnal dyspnea.  Respiratory: Patient denies any cough, shortness of breath, or wheezing.    Gastrointestinal: Patient denies any vomiting, diarrhea, constipation, melena, hematochezia, or reflux symptoms  Has nausea after taking  "Ozempic injection.     Skin: Denies any rashes or skin lesions  Neurology: Patient denies any new motor or sensory losses. Denies any numbness, tingling, weakness, and incoordination of the extremities. Patient also denies any tremor, seizures, or gait instability.  Endocrinology: Denies any polyuria, polydipsia, polyphagia, or heat/cold intolerance.  Psychiatric: Patient denies any depression, or suicidal/homicidal ideation. Anxiety symptoms have been stable with the current medication.    Objective   /78 (BP Location: Right arm, Patient Position: Sitting, BP Cuff Size: Adult)   Pulse 79   Temp 36.4 °C (97.5 °F) (Temporal)   Ht 1.778 m (5' 10\")   Wt 124 kg (272 lb 12.8 oz)   SpO2 94%   BMI 39.14 kg/m²     Physical Exam  General Appearance: Alert and cooperative, in no acute distress, well-developed/well-nourished obese male.    Neck: Supple and without adenopathy or rigidity. There is no JVD at 90° and no carotid bruits are noted. There is no thyromegaly, thyroid tenderness, or palpable thyroid nodules.  Heart: Regular rate and rhythm without murmur or ectopy.  Lungs: Clear to auscultation bilaterally with good air exchange.  Skin: Good turgor, moist, warm and without rashes or lesions.  Neurological exam: Alert and oriented ×3, no tremor, normal gait.  Extremities: No clubbing, cyanosis, or edema  Psychiatric: Appropriate mood and affect, good insight and judgment, no delusions or thought disorders, no suicidal or homicidal ideation    Assessment/Plan       Hyperlipidemia:   Stable based on last labs.  Patient will continue with the current medication.  Dietary changes, exercise, and maintenance of healthy weight were discussed at length.  Lab Results   Component Value Date    CHOL 111 03/10/2025    CHOL 129 09/06/2024    CHOL 122 12/02/2023     Lab Results   Component Value Date    HDL 34 (L) 03/10/2025    HDL 30.0 09/06/2024    HDL 28.8 12/02/2023     Lab Results   Component Value Date    LDLCALC 48 " 03/10/2025    LDLCALC 47 09/06/2024    LDLCALC 38 12/02/2023     Lab Results   Component Value Date    TRIG 220 (H) 03/10/2025    TRIG 262 (H) 09/06/2024    TRIG 275 (H) 12/02/2023         Coronary artery disease:   Patient is without worrisome signs or symptoms for unstable angina.  Risk factor modification was discussed at length.  Dietary changes, exercise and maintenance of a healthy weight were discussed.  The patient is status post prior drug-eluting stent of the LAD in December 2019 along with PTCA of the diagonal branch. He has a 50% stenosis in the mid RCA, managed medically, and has normal LV function.    Stress test on January 4, 2022 disclosed no ischemia at 8.5 METS.  Cardiology was able to get semaglutide approved by his insurance after an initial denial.  This will certainly aid in cardiovascular risk reduction.    Vitamin D deficiency:  Stable based on most recent labs.  We will continue the current dose of vitamin D supplementation.    Erectile dysfunction:  Stable based on symptoms.  Continue the sildenafil as needed.     Anxiety/depression:  Stable based on symptoms.  We will continue the current medications.    GERD:  Stable based on symptoms.  Appropriate dietary changes encouraged.  Continue the current treatment plan.      IFG (impaired fasting glucose):  Stable based on last labs.  Appropriate dietary changes and weight loss were discussed at length.  We discussed the weight loss would greatly reduce his risk of progressing to diabetes.    Obesity:   Dietary changes, exercise, and maintenance of a healthy weight were discussed at length.  Goal is to achieve a BMI less than 25.  Currently on semaglutide for cardiovascular risk reduction which we are hopeful will aid in weight loss as well.    Prostate cancer screening:  Ordered annual PSA screening.     Nausea:   Has had some mild nausea since starting semaglutide.  Will start him on Zofran to be taken as needed.  - ondansetron (Zofran) 4 mg  tablet.  Take 1 tablet (4 mg) by mouth every 8 hours if needed for nausea. Dispense: 20 tablet; Refill: 0     Follow up in 3 months.      Scribe Attestation  By signing my name below, I, Lubna Schultz   attest that this documentation has been prepared under the direction and in the presence of Kulwant Maxwell DO.    Orders Placed This Encounter   Procedures    Prostate Specific Antigen, Screen    Comprehensive Metabolic Panel    Lipid Panel    Hemoglobin A1C    Vitamin D 25-Hydroxy,Total (for eval of Vitamin D levels)     Requested Prescriptions     Signed Prescriptions Disp Refills    busPIRone (Buspar) 15 mg tablet 60 tablet 0     Sig: Take 1 tablet (15 mg) by mouth 2 times a day.    sertraline (Zoloft) 100 mg tablet 90 tablet 0     Sig: Take 1 tablet (100 mg) by mouth once daily.    aspirin 81 mg chewable tablet 90 tablet 0     Sig: Chew and swallow 1 tablet (81 mg) once daily.    atenolol (Tenormin) 25 mg tablet 90 tablet 0     Sig: Take 1 tablet (25 mg) by mouth once daily.    esomeprazole (NexIUM) 20 mg DR capsule 90 capsule 0     Sig: Take 1 capsule (20 mg) by mouth once daily in the morning. Take before meals.    icosapent ethyL (Vascepa) 1 gram capsule 180 capsule 0     Sig: Take 1 capsule (1 g) by mouth 2 times daily (morning and late afternoon).    ondansetron (Zofran) 4 mg tablet 20 tablet 0     Sig: Take 1 tablet (4 mg) by mouth every 8 hours if needed for nausea.

## 2025-07-10 NOTE — PATIENT INSTRUCTIONS
Follow up in 3 months with labs to be done PRIOR.    It was a pleasure to see you today. Thank you for choosing us for your health care needs.    If you have lab or other testing completed and have not been informed of results within one week, please call the office for your results.    If you haven't done so, consider signing up for [facility] [employer], the [facility] personal health record. Ask the staff how you can get started.

## 2025-07-21 ENCOUNTER — TELEPHONE (OUTPATIENT)
Dept: CARDIOLOGY | Facility: CLINIC | Age: 50
End: 2025-07-21
Payer: COMMERCIAL

## 2025-07-21 NOTE — TELEPHONE ENCOUNTER
Patient called, he has taken Ozempic Inj 0.25 mg for 4 weeks and then 0.5 mg for 4 weeks, should he increase again?    Local Pharmacy is [facility] in [place].    To Dr. Nava for review.  ---ssd.

## 2025-07-22 ENCOUNTER — PATIENT MESSAGE (OUTPATIENT)
Dept: PRIMARY CARE | Facility: CLINIC | Age: 50
End: 2025-07-22
Payer: COMMERCIAL

## 2025-07-22 DIAGNOSIS — I25.10 CORONARY ARTERY DISEASE INVOLVING NATIVE CORONARY ARTERY OF NATIVE HEART WITHOUT ANGINA PECTORIS: Primary | ICD-10-CM

## 2025-07-22 DIAGNOSIS — E66.813 CLASS 3 SEVERE OBESITY WITH SERIOUS COMORBIDITY AND BODY MASS INDEX (BMI) OF 40.0 TO 44.9 IN ADULT: ICD-10-CM

## 2025-09-15 ENCOUNTER — APPOINTMENT (OUTPATIENT)
Dept: CARDIOLOGY | Facility: CLINIC | Age: 50
End: 2025-09-15
Payer: COMMERCIAL

## 2025-10-13 ENCOUNTER — APPOINTMENT (OUTPATIENT)
Dept: PRIMARY CARE | Facility: CLINIC | Age: 50
End: 2025-10-13
Payer: COMMERCIAL

## (undated) DEVICE — BANDAGE COMPR M W4INXL10YD WHT BGE VELC E MTRX HK AND LOOP

## (undated) DEVICE — GOWN,SIRUS,NONRNF,SETINSLV,2XL,18/CS: Brand: MEDLINE

## (undated) DEVICE — FRAZIER SUCTION INSTRUMENT 12 FR W/CONTROL VENT & OBTURATOR: Brand: FRAZIER

## (undated) DEVICE — GLOVE ORANGE PI 8 1/2   MSG9085

## (undated) DEVICE — GLOVE ORANGE PI 7 1/2   MSG9075

## (undated) DEVICE — SUTURE VICRYL SZ 2-0 L36IN ABSRB UD L36MM CT-1 1/2 CIR J945H

## (undated) DEVICE — SPONGE,LAP,18"X18",DLX,XR,ST,5/PK,40/PK: Brand: MEDLINE

## (undated) DEVICE — PADDING CAST W4INXL4YD HIGHLY ABSRB THAN COT EZ APPL

## (undated) DEVICE — TUBING, SUCTION, 9/32" X 12', STRAIGHT: Brand: MEDLINE INDUSTRIES, INC.

## (undated) DEVICE — NEPTUNE E-SEP SMOKE EVACUATION PENCIL, COATED, 70MM BLADE, PUSH BUTTON SWITCH: Brand: NEPTUNE E-SEP

## (undated) DEVICE — COVER,MAYO STAND,STERILE: Brand: MEDLINE

## (undated) DEVICE — DRESSING HYDROFIBER AQUACEL AG ADVANTAGE 3.5X6 IN

## (undated) DEVICE — Device

## (undated) DEVICE — SUTURE MONOCRYL SZ 3-0 L27IN ABSRB UD L19MM PS-2 3/8 CIR PRIM Y427H

## (undated) DEVICE — ELECTRODE PT RET AD L9FT HI MOIST COND ADH HYDRGEL CORDED

## (undated) DEVICE — SYRINGE IRRIG 60ML SFT PLIABLE BLB EZ TO GRP 1 HND USE W/

## (undated) DEVICE — C-ARM: Brand: UNBRANDED

## (undated) DEVICE — GLOVE ORANGE PI 8   MSG9080

## (undated) DEVICE — YANKAUER,SMOOTH HANDLE,HIGH CAPACITY: Brand: MEDLINE INDUSTRIES, INC.

## (undated) DEVICE — SPLINT ORTH DBL SIDE 15 FTX4 IN RL PADDED FIBERGLASS LF

## (undated) DEVICE — HAND: Brand: MEDLINE INDUSTRIES, INC.

## (undated) DEVICE — BANDAGE COBAN 4 IN COMPR W4INXL5YD FOAM COHESIVE QUIK STK SELF ADH SFT

## (undated) DEVICE — SINGLE PORT MANIFOLD: Brand: NEPTUNE 2

## (undated) DEVICE — LIQUIBAND RAPID ADHESIVE 36/CS 0.8ML: Brand: MEDLINE

## (undated) DEVICE — REAMER SURG DIA7.5MM LO PROF EXTRA THN SHFT TWO FLUT DSGN

## (undated) DEVICE — BLADE,CARBON-STEEL,10,STRL,DISPOSABLE,TB: Brand: MEDLINE